# Patient Record
Sex: MALE | Race: WHITE | NOT HISPANIC OR LATINO | Employment: FULL TIME | URBAN - NONMETROPOLITAN AREA
[De-identification: names, ages, dates, MRNs, and addresses within clinical notes are randomized per-mention and may not be internally consistent; named-entity substitution may affect disease eponyms.]

---

## 2019-02-11 DIAGNOSIS — J10.1 INFLUENZA A: Primary | ICD-10-CM

## 2019-02-11 RX ORDER — OSELTAMIVIR PHOSPHATE 75 MG/1
75 CAPSULE ORAL 2 TIMES DAILY
Qty: 10 CAPSULE | Refills: 0 | Status: SHIPPED | OUTPATIENT
Start: 2019-02-11 | End: 2019-02-16

## 2021-03-24 ENCOUNTER — IMMUNIZATION (OUTPATIENT)
Dept: FAMILY MEDICINE | Facility: OTHER | Age: 46
End: 2021-03-24
Attending: FAMILY MEDICINE
Payer: COMMERCIAL

## 2021-03-24 PROCEDURE — 0001A PR COVID VAC PFIZER DIL RECON 30 MCG/0.3 ML IM: CPT

## 2021-03-24 PROCEDURE — 91300 PR COVID VAC PFIZER DIL RECON 30 MCG/0.3 ML IM: CPT

## 2021-04-14 ENCOUNTER — IMMUNIZATION (OUTPATIENT)
Dept: FAMILY MEDICINE | Facility: OTHER | Age: 46
End: 2021-04-14
Attending: FAMILY MEDICINE
Payer: COMMERCIAL

## 2021-04-14 PROCEDURE — 91300 PR COVID VAC PFIZER DIL RECON 30 MCG/0.3 ML IM: CPT

## 2021-04-14 PROCEDURE — 0002A PR COVID VAC PFIZER DIL RECON 30 MCG/0.3 ML IM: CPT

## 2022-07-12 ENCOUNTER — OFFICE VISIT (OUTPATIENT)
Dept: FAMILY MEDICINE | Facility: OTHER | Age: 47
End: 2022-07-12
Attending: FAMILY MEDICINE
Payer: COMMERCIAL

## 2022-07-12 ENCOUNTER — HOSPITAL ENCOUNTER (OUTPATIENT)
Dept: GENERAL RADIOLOGY | Facility: OTHER | Age: 47
Discharge: HOME | End: 2022-07-12
Attending: FAMILY MEDICINE
Payer: COMMERCIAL

## 2022-07-12 VITALS
WEIGHT: 191 LBS | RESPIRATION RATE: 20 BRPM | HEART RATE: 76 BPM | OXYGEN SATURATION: 98 % | SYSTOLIC BLOOD PRESSURE: 122 MMHG | DIASTOLIC BLOOD PRESSURE: 84 MMHG | TEMPERATURE: 97.4 F | BODY MASS INDEX: 29.47 KG/M2

## 2022-07-12 DIAGNOSIS — S99.912A ANKLE INJURY, LEFT, INITIAL ENCOUNTER: ICD-10-CM

## 2022-07-12 DIAGNOSIS — S82.302A CLOSED FRACTURE OF DISTAL END OF LEFT TIBIA, UNSPECIFIED FRACTURE MORPHOLOGY, INITIAL ENCOUNTER: Primary | ICD-10-CM

## 2022-07-12 DIAGNOSIS — S00.81XA ABRASION OF FOREHEAD, INITIAL ENCOUNTER: ICD-10-CM

## 2022-07-12 DIAGNOSIS — Z82.61 FAMILY HISTORY OF RHEUMATOID ARTHRITIS: ICD-10-CM

## 2022-07-12 PROBLEM — R20.9 DISTURBANCE OF SKIN SENSATION: Status: ACTIVE | Noted: 2022-07-12

## 2022-07-12 LAB
ALBUMIN SERPL-MCNC: 4.2 G/DL (ref 3.5–5.7)
ALP SERPL-CCNC: 51 U/L (ref 34–104)
ALT SERPL W P-5'-P-CCNC: 18 U/L (ref 7–52)
ANION GAP SERPL CALCULATED.3IONS-SCNC: 5 MMOL/L (ref 3–14)
AST SERPL W P-5'-P-CCNC: 22 U/L (ref 13–39)
BILIRUB SERPL-MCNC: 0.6 MG/DL (ref 0.3–1)
BUN SERPL-MCNC: 18 MG/DL (ref 7–25)
CALCIUM SERPL-MCNC: 9.4 MG/DL (ref 8.6–10.3)
CHLORIDE BLD-SCNC: 105 MMOL/L (ref 98–107)
CHOLEST SERPL-MCNC: 167 MG/DL
CO2 SERPL-SCNC: 30 MMOL/L (ref 21–31)
CREAT SERPL-MCNC: 0.99 MG/DL (ref 0.7–1.3)
FASTING STATUS PATIENT QL REPORTED: ABNORMAL
GFR SERPL CREATININE-BSD FRML MDRD: >90 ML/MIN/1.73M2
GLUCOSE BLD-MCNC: 95 MG/DL (ref 70–105)
HDLC SERPL-MCNC: 49 MG/DL (ref 23–92)
HOLD SPECIMEN: NORMAL
LDLC SERPL CALC-MCNC: 109 MG/DL
NONHDLC SERPL-MCNC: 118 MG/DL
POTASSIUM BLD-SCNC: 4.6 MMOL/L (ref 3.5–5.1)
PROT SERPL-MCNC: 7 G/DL (ref 6.4–8.9)
RHEUMATOID FACT SER NEPH-ACNC: <14 IU/ML
SODIUM SERPL-SCNC: 140 MMOL/L (ref 134–144)
TRIGL SERPL-MCNC: 47 MG/DL

## 2022-07-12 PROCEDURE — 80061 LIPID PANEL: CPT | Mod: ZL | Performed by: FAMILY MEDICINE

## 2022-07-12 PROCEDURE — 80053 COMPREHEN METABOLIC PANEL: CPT | Mod: ZL | Performed by: FAMILY MEDICINE

## 2022-07-12 PROCEDURE — 73630 X-RAY EXAM OF FOOT: CPT | Mod: LT

## 2022-07-12 PROCEDURE — 99203 OFFICE O/P NEW LOW 30 MIN: CPT | Performed by: FAMILY MEDICINE

## 2022-07-12 PROCEDURE — 73610 X-RAY EXAM OF ANKLE: CPT | Mod: LT

## 2022-07-12 PROCEDURE — 86431 RHEUMATOID FACTOR QUANT: CPT | Mod: ZL | Performed by: FAMILY MEDICINE

## 2022-07-12 PROCEDURE — 86200 CCP ANTIBODY: CPT | Mod: ZL | Performed by: FAMILY MEDICINE

## 2022-07-12 PROCEDURE — 36415 COLL VENOUS BLD VENIPUNCTURE: CPT | Mod: ZL | Performed by: FAMILY MEDICINE

## 2022-07-12 ASSESSMENT — PAIN SCALES - GENERAL: PAINLEVEL: MODERATE PAIN (5)

## 2022-07-12 NOTE — PROGRESS NOTES
Assessment & Plan       ICD-10-CM    1. Closed fracture of distal end of left tibia, unspecified fracture morphology, initial encounter  S82.302A Orthopedic  Referral     Ankle/Foot Bracing Supplies Order for DME - ONLY FOR DME   2. Ankle injury, left, initial encounter  S99.912A XR Ankle Left G/E 3 Views     XR Foot Left G/E 3 Views   3. Abrasion of forehead, initial encounter  S00.81XA    4. Family history of rheumatoid arthritis  Z82.61 Lipid Profile     Comprehensive Metabolic Panel     Rheumatoid factor     Cyclic Citrullinated Peptide Antibody IgG     Lipid Profile     Comprehensive Metabolic Panel     Rheumatoid factor     Cyclic Citrullinated Peptide Antibody IgG     1.  X-rays reviewed with patient and his wife.  2.  Discussed that the tibia is a weightbearing bone, therefore, during time of acute fracture he should be nonweightbearing.  He is placed in a boot today, he does have crutches.  Boot can be removed for icing, bathing at this time.  3.  He is to follow-up with sports medicine next week to discuss immobilization/casting and follow-up care.  4.  Continue with over-the-counter analgesics.  5.  Labs are reviewed as below. CCP is pending.      Review of the result(s) of each unique test - xray and labs  Ordering of each unique test  31 minutes spent on the date of the encounter doing chart review, history and exam, documentation and further activities per the note         Return in about 6 days (around 7/18/2022) for Dr Munoz.     Nursing Notes:   Viktoria Reid LPN  7/12/2022  3:26 PM  Signed  Pt presents to clinic today for a left ankle injury. Patient states on Friday he had a trench of dirt fall onto him and injured his ankle.       FOOD SECURITY SCREENING QUESTIONS:    The next two questions are to help us understand your food security.  If you are feeling you need any assistance in this area, we have resources available to support you today.    Hunger Vital Signs:  Within the  past 12 months we worried whether our food would run out before we got money to buy more. Never  Within the past 12 months the food we bought just didn't last and we didn't have money to get more. Never          Medication Reconciliation: Jeanette Avendano, ALEXANDER,LPN on 7/12/2022 at 3:05 PM        ANA CRISTINA ARREOLA MD  [facility]    Muna Dennis is a 46 year old, presenting for the following health issues:  Trauma  DOI - July 8th.   Left ankle injury - he was at work down in a 6 foot trench.  A chunk of dirt/rock fell from above  - glanced off the left side of his forehead and then landed on his left ankle.  He heard a snap on both sides of his ankle. This was about 100#.  It knocked him backwards too.  He did not get knocked out.   He had the immediate onset of ankle pain, initially could not bear weight.  Ankle started to become more swollen, bruising was noted on both medial and lateral sides.  He can move his toes.  Pain is worse medially.  Yesterday he could start to bear wt.  Has been icing.    Taking 2 aleve bid.      Trauma    History of Present Illness       Reason for visit:  Left ankle injury  Symptom onset:  1-3 days ago  Symptoms include:  Ankle injury at home  Symptom intensity:  Moderate  Symptom progression:  Improving  Had these symptoms before:  No  What makes it worse:  Stepping/walking  What makes it better:  Rest    He eats 2-3 servings of fruits and vegetables daily.He consumes 0 sweetened beverage(s) daily.He exercises with enough effort to increase his heart rate 10 to 19 minutes per day.  He exercises with enough effort to increase his heart rate 3 or less days per week.   He is taking medications regularly.       Pain History:  When did you first notice your pain? - Acute Pain   Have you seen anyone else for your pain? No  Where in your body do you have pain? left ankle     No current outpatient medications on file.     No current  facility-administered medications for this visit.        Review of Systems   He is hoping to have some labs done today.  A few weeks ago, he was having some pain in his right hand.  Positive family history of rheumatoid arthritis in his father.  Also, is hoping to have cholesterol/diabetes testing done.      Objective    /84 (BP Location: Right arm, Patient Position: Sitting, Cuff Size: Adult Large)   Pulse 76   Temp 97.4  F (36.3  C) (Tympanic)   Resp 20   Wt 86.6 kg (191 lb)   SpO2 98%   BMI 29.47 kg/m    Body mass index is 29.47 kg/m .  Physical Exam   GENERAL: healthy, alert and no distress  EYES: Eyes grossly normal to inspection  HENT: Abrasion left side of his forehead  MS: Left ankle is swollen.  There is ecchymosis medially and laterally, especially along the lower area of the foot.  He has tenderness of both medial and lateral malleolus, does not have tenderness anteriorly.  Mild decreased range of motion in flexion and extension but moderate decreased range of motion in inversion and eversion.  Normal sensation distally, normal capillary refill.    Results for orders placed or performed during the hospital encounter of 07/12/22   XR Ankle Left G/E 3 Views     Status: None    Narrative    PROCEDURE:  XR ANKLE LEFT G/E 3 VIEWS    HISTORY: Ankle injury, left, initial encounter    COMPARISON:  None.    TECHNIQUE:  3 views of the left ankle were obtained.    FINDINGS: There is an oblique fracture of the medial malleolus. The  fractures in anatomic alignment. The lateral malleolus is intact. The  talus and calcaneus appear normal.      Impression    IMPRESSION: Nondisplaced medial malleolar fracture.      PAIGE JAFFE MD         SYSTEM ID:  [id number]   Results for orders placed or performed during the hospital encounter of 07/12/22   XR Foot Left G/E 3 Views     Status: None    Narrative    PROCEDURE:  XR FOOT LEFT G/E 3 VIEWS    HISTORY: Ankle injury, left, initial encounter    COMPARISON:   None.    TECHNIQUE:  3 views of the left foot were obtained.    FINDINGS:  No fracture or dislocation is identified. The joint spaces  are preserved.        Impression    IMPRESSION: No acute fracture.      PAIGE JAFFE MD         SYSTEM ID:  [id number]   Results for orders placed or performed in visit on 07/12/22   Lipid Profile     Status: Abnormal   Result Value Ref Range    Cholesterol 167 <200 mg/dL    Triglycerides 47 <150 mg/dL    Direct Measure HDL 49 23 - 92 mg/dL    LDL Cholesterol Calculated 109 (H) <=100 mg/dL    Non HDL Cholesterol 118 <130 mg/dL    Patient Fasting > 8hrs? Unknown     Narrative    Cholesterol  Desirable:  <200 mg/dL    Triglycerides  Normal:  Less than 150 mg/dL  Borderline High:  150-199 mg/dL  High:  200-499 mg/dL  Very High:  Greater than or equal to 500 mg/dL    Direct Measure HDL  Female:  Greater than or equal to 50 mg/dL   Male:  Greater than or equal to 40 mg/dL    LDL Cholesterol  Desirable:  <100mg/dL  Above Desirable:  100-129 mg/dL   Borderline High:  130-159 mg/dL   High:  160-189 mg/dL   Very High:  >= 190 mg/dL    Non HDL Cholesterol  Desirable:  130 mg/dL  Above Desirable:  130-159 mg/dL  Borderline High:  160-189 mg/dL  High:  190-219 mg/dL  Very High:  Greater than or equal to 220 mg/dL   Comprehensive Metabolic Panel     Status: Normal   Result Value Ref Range    Sodium 140 134 - 144 mmol/L    Potassium 4.6 3.5 - 5.1 mmol/L    Chloride 105 98 - 107 mmol/L    Carbon Dioxide (CO2) 30 21 - 31 mmol/L    Anion Gap 5 3 - 14 mmol/L    Urea Nitrogen 18 7 - 25 mg/dL    Creatinine 0.99 0.70 - 1.30 mg/dL    Calcium 9.4 8.6 - 10.3 mg/dL    Glucose 95 70 - 105 mg/dL    Alkaline Phosphatase 51 34 - 104 U/L    AST 22 13 - 39 U/L    ALT 18 7 - 52 U/L    Protein Total 7.0 6.4 - 8.9 g/dL    Albumin 4.2 3.5 - 5.7 g/dL    Bilirubin Total 0.6 0.3 - 1.0 mg/dL    GFR Estimate >90 >60 mL/min/1.73m2   Rheumatoid factor     Status: Normal   Result Value Ref Range    Rheumatoid Factor <14 <14  IU/mL   Extra Tube     Status: None    Narrative    The following orders were created for panel order Extra Tube.  Procedure                               Abnormality         Status                     ---------                               -----------         ------                     Extra Purple Top Tube[[id number]]                            Final result                 Please view results for these tests on the individual orders.   Extra Purple Top Tube     Status: None   Result Value Ref Range    Hold Specimen                     .  ..

## 2022-07-12 NOTE — NURSING NOTE
Pt presents to clinic today for a left ankle injury. Patient states on Friday he had a trench of dirt fall onto him and injured his ankle.       FOOD SECURITY SCREENING QUESTIONS:    The next two questions are to help us understand your food security.  If you are feeling you need any assistance in this area, we have resources available to support you today.    Hunger Vital Signs:  Within the past 12 months we worried whether our food would run out before we got money to buy more. Never  Within the past 12 months the food we bought just didn't last and we didn't have money to get more. Never          Medication Reconciliation: complete  Jeanette Moeller LPN,LPN on 7/12/2022 at 3:05 PM

## 2022-07-14 ENCOUNTER — TELEPHONE (OUTPATIENT)
Dept: FAMILY MEDICINE | Facility: OTHER | Age: 47
End: 2022-07-14

## 2022-07-14 LAB — CCP AB SER IA-ACNC: 1.6 U/ML

## 2022-07-14 NOTE — TELEPHONE ENCOUNTER
[facility]   Please call back.  Results?        Yudelka Castillo on 7/14/2022 at 9:04 AM

## 2022-07-18 ENCOUNTER — OFFICE VISIT (OUTPATIENT)
Dept: FAMILY MEDICINE | Facility: OTHER | Age: 47
End: 2022-07-18
Attending: FAMILY MEDICINE
Payer: COMMERCIAL

## 2022-07-18 ENCOUNTER — HOSPITAL ENCOUNTER (OUTPATIENT)
Dept: GENERAL RADIOLOGY | Facility: OTHER | Age: 47
Discharge: HOME | End: 2022-07-18
Attending: FAMILY MEDICINE
Payer: COMMERCIAL

## 2022-07-18 VITALS
HEART RATE: 62 BPM | DIASTOLIC BLOOD PRESSURE: 80 MMHG | OXYGEN SATURATION: 97 % | BODY MASS INDEX: 30.43 KG/M2 | RESPIRATION RATE: 16 BRPM | TEMPERATURE: 97.3 F | WEIGHT: 197.2 LBS | SYSTOLIC BLOOD PRESSURE: 118 MMHG

## 2022-07-18 DIAGNOSIS — S82.55XA CLOSED NONDISPLACED FRACTURE OF MEDIAL MALLEOLUS OF LEFT TIBIA, INITIAL ENCOUNTER: Primary | ICD-10-CM

## 2022-07-18 PROCEDURE — 99207 PR FRACTURE CARE IN GLOBAL PERIOD: CPT | Performed by: FAMILY MEDICINE

## 2022-07-18 PROCEDURE — 73610 X-RAY EXAM OF ANKLE: CPT | Mod: LT

## 2022-07-18 RX ORDER — NAPROXEN SODIUM 220 MG
220 TABLET ORAL 2 TIMES DAILY WITH MEALS
COMMUNITY
End: 2023-03-17

## 2022-07-18 ASSESSMENT — PAIN SCALES - GENERAL: PAINLEVEL: MODERATE PAIN (4)

## 2022-07-18 NOTE — PROGRESS NOTES
Sports Medicine Office Note    HPI:  47-year-old male coming in for evaluation of a left ankle injury that took place on .  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  He describes an axial load through the ankle.  No significant twisting or rolling of the ankle.  He was seen in clinic on  and placed nonweightbearing in a walking boot.  He endorses 4/10 pain.  He characterizes the pain as dull.  He has been using ice and OTC medications.      EXAM:  /80 (BP Location: Right arm, Patient Position: Sitting, Cuff Size: Adult Regular)   Pulse 62   Temp 97.3  F (36.3  C) (Tympanic)   Resp 16   Wt 89.4 kg (197 lb 3.2 oz)   SpO2 97%   BMI 30.43 kg/m    MUSCULOSKELETAL EXAM:  LEFT FOOT AND ANKLE  Inspection:  -No gross deformity  -No bruising  - Mild swelling about the ankle    Tenderness to palpation of the:  -Fibular head:  Negative  -Fibular shaft:  Negative  -Tibial shaft: Mild pain  -Medial malleolus: Positive  -Deltoid ligament: Mild pain  -Lateral malleolus:  Negative  -ATFL:  Negative  -CFL:  Negative  -PTFL:  Negative  -Syndesmosis (AITFL):  Negative  -Midsubstance Achilles tendon:  Negative  -Achilles tendon insertion:  Negative  -Plantar fascial origin on the calcaneus:  Negative  -Base of the fifth metatarsal:  Negative  -Navicular:  Negative  -Lisfranc joint:  Negative  -Metatarsals:  Negative    Strength:  -Dorsiflexion:  5/5  -Plantarflexion:  5/5  -Inversion:  4/5  -Eversion:  4/5    Other:  -Intact sensation to light touch distally.  -No signs of cyanosis. Normal skin temperature.  -Knee:  No gross deformity. Normal motion.  -Right foot/ankle:  No gross deformity. No palpable tenderness. Normal strength.      IMAGIN2022: 3 view left ankle x-ray  - Nondisplaced medial malleolus fracture.  Ankle mortise appears intact.    2022: 3 view left ankle x-ray  - Nondisplaced medial malleolus fracture.  There does appear to be an intra-articular component to the fracture that  is nonweightbearing but does involve the weightbearing portion of the ankle joint.      ASSESSMENT/PLAN:  Diagnoses and all orders for this visit:  Closed nondisplaced fracture of medial malleolus of left tibia, initial encounter  -     XR Ankle Left G/E 3 Views  -     Orthopedic  Referral    47-year-old male with a closed, nondisplaced medial malleoli are fracture extending into the intra-articular weightbearing portion of the joint.  X-rays from 7/12 and 7/18 were both personally reviewed in the office with the findings as demonstrated above by my interpretation.  Because of the intra-articular component of the fracture, I recommend continuing nonweightbearing status.  - Continue nonweightbearing on crutches  - Discussed immobilization options  - Continue OTC analgesics as needed  - Follow-up in 2 weeks for repeat x-rays out of the boot  - Anticipate approximately 6 weeks of nonweightbearing before transitioning into a weightbearing status  - He will likely need rehab on the back end of immobilization, likely 10-12 weeks after injury before resuming full activities    Global fracture code billing (CPT:  64987)       Josesito Chance MD  7/18/2022  3:14 PM    Total time spent with this patient was 26 minutes which included chart review, visualization and independent interpretation of images, time spent with the patient, and documentation.    Procedure time:  0 minute(s)

## 2022-07-18 NOTE — NURSING NOTE
"Chief Complaint   Patient presents with     ankle injury     Left ankle injury, DOI: 7/8/22     Patient presents for left ankle injury. DOI: 7/8/22. Pain 4/10. He is in a short walking boot at appointment today. He is mostly non weight bearing with crutches.     Initial /80 (BP Location: Right arm, Patient Position: Sitting, Cuff Size: Adult Regular)   Pulse 62   Temp 97.3  F (36.3  C) (Tympanic)   Resp 16   Wt 89.4 kg (197 lb 3.2 oz)   SpO2 97%   BMI 30.43 kg/m   Estimated body mass index is 30.43 kg/m  as calculated from the following:    Height as of 7/8/13: 1.715 m (5' 7.5\").    Weight as of this encounter: 89.4 kg (197 lb 3.2 oz).       Medication Reconciliation: Complete    Brisa Kaufman LPN .......  7/18/2022  3:21 PM   "

## 2022-08-01 ENCOUNTER — OFFICE VISIT (OUTPATIENT)
Dept: FAMILY MEDICINE | Facility: OTHER | Age: 47
End: 2022-08-01
Attending: FAMILY MEDICINE
Payer: COMMERCIAL

## 2022-08-01 ENCOUNTER — HOSPITAL ENCOUNTER (OUTPATIENT)
Dept: GENERAL RADIOLOGY | Facility: OTHER | Age: 47
Discharge: HOME | End: 2022-08-01
Attending: FAMILY MEDICINE
Payer: COMMERCIAL

## 2022-08-01 VITALS
SYSTOLIC BLOOD PRESSURE: 118 MMHG | TEMPERATURE: 97.1 F | HEART RATE: 64 BPM | BODY MASS INDEX: 30.31 KG/M2 | OXYGEN SATURATION: 98 % | DIASTOLIC BLOOD PRESSURE: 78 MMHG | RESPIRATION RATE: 16 BRPM | WEIGHT: 196.4 LBS

## 2022-08-01 DIAGNOSIS — S82.55XD CLOSED NONDISPLACED FRACTURE OF MEDIAL MALLEOLUS OF LEFT TIBIA WITH ROUTINE HEALING, SUBSEQUENT ENCOUNTER: Primary | ICD-10-CM

## 2022-08-01 DIAGNOSIS — S93.492D SPRAIN OF ANTERIOR TALOFIBULAR LIGAMENT OF LEFT ANKLE, SUBSEQUENT ENCOUNTER: ICD-10-CM

## 2022-08-01 PROCEDURE — 99207 PR FRACTURE CARE IN GLOBAL PERIOD: CPT | Performed by: FAMILY MEDICINE

## 2022-08-01 PROCEDURE — 73610 X-RAY EXAM OF ANKLE: CPT | Mod: LT

## 2022-08-01 ASSESSMENT — PAIN SCALES - GENERAL: PAINLEVEL: NO PAIN (1)

## 2022-08-01 NOTE — PROGRESS NOTES
Sports Medicine Office Note    HPI:  47-year-old male coming in for follow-up evaluation of a left ankle injury that took place on .  He was seen in primary care clinic on .  He followed up in this office on .  He has a known medial malleolus fracture.  He is currently being treated nonweightbearing, on crutches, in a walking boot.  He is currently endorsing 1/10 pain.  He characterizes the pain as dull.      EXAM:  /78 (BP Location: Right arm, Patient Position: Sitting, Cuff Size: Adult Regular)   Pulse 64   Temp 97.1  F (36.2  C) (Tympanic)   Resp 16   Wt 89.1 kg (196 lb 6.4 oz)   SpO2 98%   BMI 30.31 kg/m    MUSCULOSKELETAL EXAM:  LEFT FOOT AND ANKLE  Inspection:  -No gross deformity  -No bruising or swelling  -Scars:  None    Tenderness to palpation of the:  -Fibular head:  Negative  -Fibular shaft:  Negative  -Tibial shaft:  Negative  -Medial malleolus:  Negative  -Deltoid ligament:  Negative  -Lateral malleolus:  Negative  -ATFL: Mild pain  -CFL:  Negative  -PTFL:  Negative  -Syndesmosis (AITFL):  Negative  -Midsubstance Achilles tendon:  Negative  -Achilles tendon insertion:  Negative  -Plantar fascial origin on the calcaneus:  Negative  -Base of the fifth metatarsal:  Negative  -Navicular:  Negative  -Lisfranc joint:  Negative  -Metatarsals:  Negative    Other:  -Intact sensation to light touch distally.  -No signs of cyanosis. Normal skin temperature.  -Knee:  No gross deformity. Normal motion.  -Right foot/ankle:  No gross deformity. No palpable tenderness. Normal strength.      IMAGIN2022: 3 view left ankle x-ray  - Nondisplaced medial malleolus fracture.  Ankle mortise appears intact.     2022: 3 view left ankle x-ray  - Nondisplaced medial malleolus fracture.  There does appear to be an intra-articular component to the fracture that is nonweightbearing but does involve the weightbearing portion of the ankle joint.    2022: 3 view left ankle x-ray  - Nondisplaced  medial malleolus fracture is again noted.  No change in bony alignment.      ASSESSMENT/PLAN:  Diagnoses and all orders for this visit:  Closed nondisplaced fracture of medial malleolus of left tibia with routine healing, subsequent encounter  -     XR Ankle Left G/E 3 Views  Sprain of anterior talofibular ligament of left ankle, subsequent encounter    47-year-old male with a closed, nondisplaced medial malleolus fracture extending into the intra-articular weightbearing portion of the joint.  X-rays from 7/12, 7/18, and 8/1 were all personally reviewed in the office with the findings as demonstrated above by my interpretation.  - Continue nonweightbearing  - Continue the walking boot  - Continue OTC analgesics as needed  - This patient's daughter is getting  in 5 days and he would like to walk her down the aisle, we had a risks/benefits discussion about doing this with his current injury  - Follow-up in 3 weeks for repeat x-rays out of the boot  - If patient is beginning to demonstrate good clinical and radiographic evidence of healing, consider transitioning into a weightbearing status    Global fracture code billing (CPT:  92221)       Josesito Chance MD  8/1/2022  2:55 PM    Total time spent with this patient was 23 minutes which included chart review, visualization and independent interpretation of images, time spent with the patient, and documentation.    Procedure time:  0 minute(s)

## 2022-08-01 NOTE — NURSING NOTE
"Chief Complaint   Patient presents with     Follow Up     Medial malleolus left tibia fracture     Patient presents for follow up medial malleolus left tibia fracture. Pain 1/10. He is non weight bearing with crutches and in a short walking boot at appointment today.     Initial /78 (BP Location: Right arm, Patient Position: Sitting, Cuff Size: Adult Regular)   Pulse 64   Temp 97.1  F (36.2  C) (Tympanic)   Resp 16   Wt 89.1 kg (196 lb 6.4 oz)   SpO2 98%   BMI 30.31 kg/m   Estimated body mass index is 30.31 kg/m  as calculated from the following:    Height as of 7/8/13: 1.715 m (5' 7.5\").    Weight as of this encounter: 89.1 kg (196 lb 6.4 oz).       Medication Reconciliation: Complete    Brisa Kaufman LPN .......  8/1/2022  3:07 PM   "

## 2022-08-30 ENCOUNTER — HOSPITAL ENCOUNTER (OUTPATIENT)
Dept: GENERAL RADIOLOGY | Facility: OTHER | Age: 47
Discharge: HOME | End: 2022-08-30
Attending: FAMILY MEDICINE
Payer: COMMERCIAL

## 2022-08-30 ENCOUNTER — OFFICE VISIT (OUTPATIENT)
Dept: FAMILY MEDICINE | Facility: OTHER | Age: 47
End: 2022-08-30
Attending: FAMILY MEDICINE
Payer: COMMERCIAL

## 2022-08-30 VITALS
WEIGHT: 201 LBS | RESPIRATION RATE: 16 BRPM | TEMPERATURE: 96.8 F | DIASTOLIC BLOOD PRESSURE: 86 MMHG | BODY MASS INDEX: 31.02 KG/M2 | SYSTOLIC BLOOD PRESSURE: 130 MMHG | OXYGEN SATURATION: 98 % | HEART RATE: 73 BPM

## 2022-08-30 VITALS
SYSTOLIC BLOOD PRESSURE: 130 MMHG | RESPIRATION RATE: 16 BRPM | TEMPERATURE: 96.8 F | BODY MASS INDEX: 31.14 KG/M2 | DIASTOLIC BLOOD PRESSURE: 86 MMHG | WEIGHT: 201.8 LBS | OXYGEN SATURATION: 98 % | HEART RATE: 73 BPM

## 2022-08-30 DIAGNOSIS — Z23 NEED FOR DIPHTHERIA-TETANUS-PERTUSSIS (TDAP) VACCINE: ICD-10-CM

## 2022-08-30 DIAGNOSIS — K62.5 RECTAL BLEEDING: Primary | ICD-10-CM

## 2022-08-30 DIAGNOSIS — S82.55XD CLOSED NONDISPLACED FRACTURE OF MEDIAL MALLEOLUS OF LEFT TIBIA WITH ROUTINE HEALING, SUBSEQUENT ENCOUNTER: ICD-10-CM

## 2022-08-30 DIAGNOSIS — S82.55XD CLOSED NONDISPLACED FRACTURE OF MEDIAL MALLEOLUS OF LEFT TIBIA WITH ROUTINE HEALING, SUBSEQUENT ENCOUNTER: Primary | ICD-10-CM

## 2022-08-30 DIAGNOSIS — Z12.11 SPECIAL SCREENING FOR MALIGNANT NEOPLASMS, COLON: ICD-10-CM

## 2022-08-30 PROCEDURE — 90471 IMMUNIZATION ADMIN: CPT | Performed by: FAMILY MEDICINE

## 2022-08-30 PROCEDURE — 99207 PR FRACTURE CARE IN GLOBAL PERIOD: CPT | Performed by: FAMILY MEDICINE

## 2022-08-30 PROCEDURE — 99213 OFFICE O/P EST LOW 20 MIN: CPT | Mod: 25 | Performed by: FAMILY MEDICINE

## 2022-08-30 PROCEDURE — 90715 TDAP VACCINE 7 YRS/> IM: CPT | Performed by: FAMILY MEDICINE

## 2022-08-30 PROCEDURE — 73610 X-RAY EXAM OF ANKLE: CPT | Mod: LT

## 2022-08-30 ASSESSMENT — PAIN SCALES - GENERAL
PAINLEVEL: MODERATE PAIN (4)
PAINLEVEL: NO PAIN (1)

## 2022-08-30 NOTE — NURSING NOTE
"Chief Complaint   Patient presents with     Injury     Follow up      Patient is here for a follow up on ankle injury. He did just see Dr. Munoz also for it.     Initial /86   Pulse 73   Temp 96.8  F (36  C)   Resp 16   Wt 91.2 kg (201 lb)   SpO2 98%   BMI 31.02 kg/m   Estimated body mass index is 31.02 kg/m  as calculated from the following:    Height as of 7/8/13: 1.715 m (5' 7.5\").    Weight as of this encounter: 91.2 kg (201 lb).  Medication Reconciliation: complete    Marimar Walker MA       FOOD SECURITY SCREENING QUESTIONS:    The next two questions are to help us understand your food security.  If you are feeling you need any assistance in this area, we have resources available to support you today.    Hunger Vital Signs:  Within the past 12 months we worried whether our food would run out before we got money to buy more. Never  Within the past 12 months the food we bought just didn't last and we didn't have money to get more. Never  Marimar Walker MA,LPN on 8/30/2022 at 3:12 PM    Immunization Documentation    Prior to Immunization administration, verified patients identity using patient's name and date of birth. Please see IMMUNIZATIONS  and order for additional information.  Patient / Parent instructed to remain in clinic for 15 minutes and report any adverse reaction to staff immediately.    Was the entire amount of vaccines given used? Yes    Marimar Walker MA  8/30/2022   3:33 PM        "

## 2022-08-30 NOTE — NURSING NOTE
"Chief Complaint   Patient presents with     Follow Up     Medial malleolus left tibia     Patient presents for follow up medial malleolus fracture left tibia. Pain 1/10. He is in a short walking boot at appointment today and mostly non weight bearing.     Initial /86 (BP Location: Right arm, Patient Position: Sitting, Cuff Size: Adult Regular)   Pulse 73   Temp 96.8  F (36  C) (Tympanic)   Resp 16   Wt 91.5 kg (201 lb 12.8 oz)   SpO2 98%   BMI 31.14 kg/m   Estimated body mass index is 31.14 kg/m  as calculated from the following:    Height as of 7/8/13: 1.715 m (5' 7.5\").    Weight as of this encounter: 91.5 kg (201 lb 12.8 oz).       Medication Reconciliation: Complete    Brisa Kaufman LPN .......  8/30/2022  2:40 PM   "

## 2022-08-30 NOTE — PROGRESS NOTES
Sports Medicine Office Note    HPI:  47-year-old male coming in for follow-up evaluation of a left ankle injury that took place on .  He has a nondisplaced fracture to the medial malleolus of the left tibia.  He was last seen in this office on .  He is currently nonweightbearing in a walking boot.  He is endorsing 75% improvement in his symptoms.  His current pain is 1/10.  He characterizes the pain as dull.  No new injuries.      EXAM:  /86 (BP Location: Right arm, Patient Position: Sitting, Cuff Size: Adult Regular)   Pulse 73   Temp 96.8  F (36  C) (Tympanic)   Resp 16   Wt 91.5 kg (201 lb 12.8 oz)   SpO2 98%   BMI 31.14 kg/m    MUSCULOSKELETAL EXAM:  LEFT FOOT AND ANKLE  Inspection:  -No gross deformity  -No bruising or swelling  -Scars:  None    Tenderness to palpation of the:  -Fibular head:  Negative  -Fibular shaft:  Negative  -Tibial shaft:  Negative  -Medial malleolus:  Negative  -Deltoid ligament:  Negative  -Lateral malleolus:  Negative  -ATFL:  Negative  -CFL:  Negative  -PTFL:  Negative  -Syndesmosis (AITFL):  Negative  -Midsubstance Achilles tendon:  Negative  -Achilles tendon insertion:  Negative  -Plantar fascial origin on the calcaneus:  Negative  -Base of the fifth metatarsal:  Negative  -Navicular:  Negative  -Lisfranc joint:  Negative  -Metatarsals:  Negative    Strength:  -Dorsiflexion:  5/5  -Plantarflexion:  5/5  -Inversion:  5/5  -Eversion:  5/5    Other:  -Intact sensation to light touch distally.  -No signs of cyanosis. Normal skin temperature.  -Knee:  No gross deformity. Normal motion.  -Right foot/ankle:  No gross deformity. No palpable tenderness. Normal strength.      IMAGIN2022: 3 view left ankle x-ray  - Nondisplaced medial malleolus fracture.  Ankle mortise appears intact.     2022: 3 view left ankle x-ray  - Nondisplaced medial malleolus fracture.  There does appear to be an intra-articular component to the fracture that is nonweightbearing but does  involve the weightbearing portion of the ankle joint.     8/1/2022: 3 view left ankle x-ray  - Nondisplaced medial malleolus fracture is again noted.  No change in bony alignment.    8/30/2022: 3 view left ankle x-ray  - Nondisplaced medial malleolus fracture is again noted.  No change in bony alignment.  Interval bony callus formation is demonstrated.      ASSESSMENT/PLAN:  Diagnoses and all orders for this visit:  Closed nondisplaced fracture of medial malleolus of left tibia with routine healing, subsequent encounter  -     XR Ankle Left G/E 3 Views    47-year-old male with a closed, nondisplaced medial malleolus fracture extending into the intra-articular weightbearing portion of the joint.  X-rays from 7/12, 7/18, 8/1, and 8/30 were all personally reviewed in the office with the findings as demonstrated above by my interpretation.  This patient is now 7 weeks and 4 days out from his injury.  Fracture line on radiograph is still present though there is some interval bony callus formation.  Clinically he demonstrates significant improvement.  - Ease back into weightbearing using the boot or ankle brace for protection  - Protect the ankle from repeat inversion injury for the next 3 weeks  - Use crutches only as needed  - If pain worsens, recommend going back onto crutches for another 2-3 weeks and come in for follow-up x-rays  - After 2-3 weeks he can determine whether or not he feels like he is progressing enough and whether or not he needs PT  - Follow-up as needed    Global fracture code billing (CPT:  76106)       Josesito Chance MD  8/30/2022  2:46 PM    Total time spent with this patient was 25 minutes which included chart review, visualization and independent interpretation of images, time spent with the patient, and documentation.    Procedure time:  0 minute(s)

## 2022-08-30 NOTE — PROGRESS NOTES
Assessment & Plan       ICD-10-CM    1. Rectal bleeding  K62.5 Adult GI  Referral - Procedure Only   2. Special screening for malignant neoplasms, colon  Z12.11 Adult GI  Referral - Procedure Only   3. Need for diphtheria-tetanus-pertussis (Tdap) vaccine  Z23 TDAP VACCINE (Adacel, Boostrix)  [[id number]]     1.  Differential diagnosis of bleeding is discussed.  He is not having symptoms now.  Most likely, bleeding is from internal hemorrhoid, consider polyp, mass, diverticular bleed.  Discussed diagnostic and possibly therapeutic colonoscopy (banding of hemorrhoid) as well as colon cancer screening based on age.  Referral is placed.  2.  Boostrix updated today             Return if symptoms worsen or fail to improve.    ANA CRISTINA ARREOLA MD  [facility]    Subjective   Sonny is a 47 year old, presenting for the following health issues:  Injury (Follow up )    Sonny Dubois is a 47 year old male in for evaluation of rectal bleeding.  He was taking fish oil and aleve - taking this for ankle fracture.  He then had BRBR - a fair amount.  This would be bleeding with a BM.    No pain with this.  No heartburn or other GI symptoms.    He stopped the fish oil and symptoms went away after several days, about 3 days or so.  Continues to take aleve once a day for soreness.      Colonoscopy done in 2009 - normal.    No FH of bowel disease.    He thinks he's had an internal hemorrhoid.      Injury         Pain History:  When did you first notice your pain? - Acute Pain   Have you seen anyone else for your pain? Yes - Dr. Munoz   Where in your body do you have pain? ankle     Current Outpatient Medications   Medication     naproxen sodium (ANAPROX) 220 MG tablet     No current facility-administered medications for this visit.        Review of Systems   He has been following up with sports medicine for his left ankle fracture.      He does get light bruising on arms and legs  with working.    No pain or soreness with this. This is without significant injury.        Objective    /86   Pulse 73   Temp 96.8  F (36  C)   Resp 16   Wt 91.2 kg (201 lb)   SpO2 98%   BMI 31.02 kg/m    Body mass index is 31.02 kg/m .  Physical Exam   GENERAL: healthy, alert and no distress  RESP: lungs clear to auscultation - no rales, rhonchi or wheezes  CV: regular rates and rhythm  MUSCULOSKELETAL:  Walking boot on, left                   .  ..

## 2022-08-31 ENCOUNTER — TELEPHONE (OUTPATIENT)
Dept: SURGERY | Facility: OTHER | Age: 47
End: 2022-08-31

## 2022-08-31 NOTE — TELEPHONE ENCOUNTER
[facility] Diagnostic Referral    Patient has a referral for a colonoscopy with a diagnosis of Rectal bleeding.    Please advise.    Thank you,Becca Clark on 8/31/2022 at 8:51 AM

## 2022-09-15 DIAGNOSIS — Z12.11 ENCOUNTER FOR SCREENING COLONOSCOPY: Primary | ICD-10-CM

## 2022-09-15 NOTE — TELEPHONE ENCOUNTER
Screening Questions for the Scheduling of Screening Colonoscopies   (If Colonoscopy is diagnostic, Provider should review the chart before scheduling.)  Are you younger than 50 or older than 80?  YES  Do you take aspirin or fish oil?  YES FISH OIL (if yes, tell patient to stop 1 week prior to Colonoscopy)  Do you take warfarin (Coumadin), clopidogrel (Plavix), apixaban (Eliquis), dabigatram (Pradaxa), rivaroxaban (Xarelto) or any blood thinner? NO  Do you use oxygen at home?  NO  Do you have kidney disease? NO  Are you on dialysis? NO  Have you had a stroke or heart attack in the last year? NO  Have you had a stent in your heart or any blood vessel in the last year? NO  Have you had a transplant of any organ? NO  Have you had a colonoscopy or upper endoscopy (EGD) before? YES         When?  04/10/2009  Date of scheduled Colonoscopy. 11/14/2022  Provider ANTHONY  Pharmacy GI  HOME COVID TEST 11/12/2022

## 2022-09-17 ENCOUNTER — HEALTH MAINTENANCE LETTER (OUTPATIENT)
Age: 47
End: 2022-09-17

## 2022-09-26 RX ORDER — BISACODYL 5 MG/1
10 TABLET, DELAYED RELEASE ORAL DAILY PRN
Qty: 2 TABLET | Refills: 0 | Status: ON HOLD | OUTPATIENT
Start: 2022-11-01 | End: 2022-11-14

## 2022-11-08 PROBLEM — K62.5 RECTAL BLEEDING: Status: ACTIVE | Noted: 2022-11-08

## 2022-11-14 ENCOUNTER — ANESTHESIA (OUTPATIENT)
Dept: SURGERY | Facility: OTHER | Age: 47
End: 2022-11-14
Payer: COMMERCIAL

## 2022-11-14 ENCOUNTER — HOSPITAL ENCOUNTER (OUTPATIENT)
Facility: OTHER | Age: 47
Discharge: HOME | End: 2022-11-14
Attending: SURGERY | Admitting: SURGERY
Payer: COMMERCIAL

## 2022-11-14 ENCOUNTER — ANESTHESIA EVENT (OUTPATIENT)
Dept: SURGERY | Facility: OTHER | Age: 47
End: 2022-11-14
Payer: COMMERCIAL

## 2022-11-14 VITALS
SYSTOLIC BLOOD PRESSURE: 130 MMHG | BODY MASS INDEX: 30.09 KG/M2 | TEMPERATURE: 97.9 F | WEIGHT: 195 LBS | OXYGEN SATURATION: 100 % | HEART RATE: 44 BPM | RESPIRATION RATE: 16 BRPM | DIASTOLIC BLOOD PRESSURE: 85 MMHG

## 2022-11-14 PROCEDURE — 250N000009 HC RX 250: Performed by: NURSE ANESTHETIST, CERTIFIED REGISTERED

## 2022-11-14 PROCEDURE — 45378 DIAGNOSTIC COLONOSCOPY: CPT | Performed by: SURGERY

## 2022-11-14 PROCEDURE — 999N000010 HC STATISTIC ANES STAT CODE-CRNA PER MINUTE: Performed by: SURGERY

## 2022-11-14 PROCEDURE — 93005 ELECTROCARDIOGRAM TRACING: CPT | Mod: XU

## 2022-11-14 PROCEDURE — 258N000003 HC RX IP 258 OP 636: Performed by: SURGERY

## 2022-11-14 PROCEDURE — 93010 ELECTROCARDIOGRAM REPORT: CPT | Performed by: INTERNAL MEDICINE

## 2022-11-14 PROCEDURE — 250N000011 HC RX IP 250 OP 636: Performed by: NURSE ANESTHETIST, CERTIFIED REGISTERED

## 2022-11-14 PROCEDURE — 45378 DIAGNOSTIC COLONOSCOPY: CPT | Performed by: NURSE ANESTHETIST, CERTIFIED REGISTERED

## 2022-11-14 RX ORDER — NALOXONE HYDROCHLORIDE 0.4 MG/ML
0.2 INJECTION, SOLUTION INTRAMUSCULAR; INTRAVENOUS; SUBCUTANEOUS
Status: DISCONTINUED | OUTPATIENT
Start: 2022-11-14 | End: 2022-11-14 | Stop reason: HOSPADM

## 2022-11-14 RX ORDER — PROPOFOL 10 MG/ML
INJECTION, EMULSION INTRAVENOUS CONTINUOUS PRN
Status: DISCONTINUED | OUTPATIENT
Start: 2022-11-14 | End: 2022-11-14

## 2022-11-14 RX ORDER — LIDOCAINE HYDROCHLORIDE 20 MG/ML
INJECTION, SOLUTION INFILTRATION; PERINEURAL PRN
Status: DISCONTINUED | OUTPATIENT
Start: 2022-11-14 | End: 2022-11-14

## 2022-11-14 RX ORDER — NALOXONE HYDROCHLORIDE 0.4 MG/ML
0.4 INJECTION, SOLUTION INTRAMUSCULAR; INTRAVENOUS; SUBCUTANEOUS
Status: DISCONTINUED | OUTPATIENT
Start: 2022-11-14 | End: 2022-11-14 | Stop reason: HOSPADM

## 2022-11-14 RX ORDER — LIDOCAINE 40 MG/G
CREAM TOPICAL
Status: DISCONTINUED | OUTPATIENT
Start: 2022-11-14 | End: 2022-11-14 | Stop reason: HOSPADM

## 2022-11-14 RX ORDER — SODIUM CHLORIDE, SODIUM LACTATE, POTASSIUM CHLORIDE, CALCIUM CHLORIDE 600; 310; 30; 20 MG/100ML; MG/100ML; MG/100ML; MG/100ML
INJECTION, SOLUTION INTRAVENOUS CONTINUOUS
Status: DISCONTINUED | OUTPATIENT
Start: 2022-11-14 | End: 2022-11-14 | Stop reason: HOSPADM

## 2022-11-14 RX ORDER — FLUMAZENIL 0.1 MG/ML
0.2 INJECTION, SOLUTION INTRAVENOUS
Status: DISCONTINUED | OUTPATIENT
Start: 2022-11-14 | End: 2022-11-14 | Stop reason: HOSPADM

## 2022-11-14 RX ORDER — ONDANSETRON 2 MG/ML
4 INJECTION INTRAMUSCULAR; INTRAVENOUS
Status: DISCONTINUED | OUTPATIENT
Start: 2022-11-14 | End: 2022-11-14 | Stop reason: HOSPADM

## 2022-11-14 RX ADMIN — LIDOCAINE HYDROCHLORIDE 60 MG: 20 INJECTION, SOLUTION INFILTRATION; PERINEURAL at 10:28

## 2022-11-14 RX ADMIN — SODIUM CHLORIDE, POTASSIUM CHLORIDE, SODIUM LACTATE AND CALCIUM CHLORIDE: 600; 310; 30; 20 INJECTION, SOLUTION INTRAVENOUS at 10:23

## 2022-11-14 RX ADMIN — PROPOFOL 200 MCG/KG/MIN: 10 INJECTION, EMULSION INTRAVENOUS at 10:28

## 2022-11-14 ASSESSMENT — ACTIVITIES OF DAILY LIVING (ADL): ADLS_ACUITY_SCORE: 35

## 2022-11-14 NOTE — OP NOTE
PROCEDURE NOTE    DATE OF SERVICE: 11/14/2022    SURGEON: Cliff Veliz MD    PRE-OP DIAGNOSIS:    Healthcare maintenance   Rectal bleeding        POST-OP DIAGNOSIS:  Same  Normal Exam    PROCEDURE:   Colonoscopy        ANESTHESIA:  KOMAL Santos CRNA    INDICATION FOR THE PROCEDURE: The patient is a 47 year old male with rectal bleeding . The patient has no other complaints  . After explaining the risks to include bleeding, perforation, potential inability toreach the cecum, the patient wished to proceed.    PROCEDURE:After adequate sedation, the patient was in the left lateral decubitus position.  Rectal exam was performed.  There was normal tone and no palpable masses , no fissures or hemorrhoids.  The colonoscope was introduced into the rectum and advanced to the cecum with Mild difficulty.  The patient's prep was excellent.  The terminal cecum was reached.  The cecum, ascending, transverse, descending and sigmoid colon was entirely unremarkable .  The scope was retroflexed in the rectum.  The rectum was unremarkable  .  The scope was straightened and removed.  The patient tolerated the procedure well.     ESTIMATED BLOOD LOSS: none    COMPLICATIONS:  None    TISSUE REMOVED:  No    RECOMMEND:      Follow-up in 10 years      Cliff Veliz MD FACS

## 2022-11-14 NOTE — ANESTHESIA POSTPROCEDURE EVALUATION
Patient: Sonny Dubois    Procedure: Procedure(s):  COLONOSCOPY       Anesthesia Type:  MAC    Note:  Disposition: Outpatient   Postop Pain Control:            Sign Out: Well controlled pain   PONV: No   Neuro/Psych:             Sign Out: Acceptable/Baseline neuro status   Airway/Respiratory:             Sign Out: Acceptable/Baseline resp. status   CV/Hemodynamics:             Sign Out: Acceptable CV status   Other NRE: NONE   DID A NON-ROUTINE EVENT OCCUR? No           Last vitals:  Vitals Value Taken Time   /85 11/14/22 1110   Temp     Pulse 44 11/14/22 1110   Resp 16 11/14/22 1110   SpO2 100 % 11/14/22 1111       Electronically Signed By: David Kellerman, APRN CRNA  November 14, 2022  11:45 AM

## 2022-11-14 NOTE — DISCHARGE INSTRUCTIONS
[facility]  Adult Discharge Orders & Instructions    ________________________________________________________________          For 12 hours after surgery  Get plenty of rest.  A responsible adult must stay with you for at least 12 hours after you leave the hospital.   You may feel lightheaded.  IF so, sit for a few minutes before standing.  Have someone help you get up.   You may have a slight fever. Call the doctor if your fever is over 101 F (38.3 C) (taken under the tongue) or lasts longer than 24 hours.  You may have a dry mouth, a sore throat, muscle aches or trouble sleeping.  These should go away after 24 hours.  Do not make important or legal decisions.  6.   Do not drive or use heavy equipment.  If you have weakness or tingling, don't drive or use heavy equipment until this feeling goes away.    To contact a doctor, call   [phone]_______________________

## 2022-11-14 NOTE — H&P
History and Physical    CHIEF COMPLAINT / REASON FOR PROCEDURE:  Rectal bleeding    PERTINENT HISTORY   Patient is a 47 year old male who presents today for colonoscopy for rectal bleeding.   Last colonoscopy 2009.  Blood has been in stool, in bowel and when wipes. .    Past Medical History:   Diagnosis Date     Closed nondisplaced fracture of medial condyle of right tibia 07/08/2022     Low back pain     No Comments Provided     Past Surgical History:   Procedure Laterality Date     ADENOIDECTOMY      No Comments Provided     COLONOSCOPY  04/10/2009    normal, next due age 48     TYMPANOSTOMY, LOCAL/TOPICAL ANESTHESIA      PE tube placement       Bleeding tendencies:  No    ALLERGIES/SENSITIVITIES: No Known Allergies     CURRENT MEDICATIONS:    Prior to Admission medications    Medication Sig Start Date End Date Taking? Authorizing Provider   bisacodyl (DULCOLAX) 5 MG EC tablet Take 2 tablets (10 mg) by mouth daily as needed for constipation 11/1/22   Cliff Veliz MD   naproxen sodium (ANAPROX) 220 MG tablet Take 220 mg by mouth 2 times daily (with meals)    Reported, Patient       Physical Exam:  There were no vitals taken for this visit.  EXAM:  Chest/Respiratory Exam: Normal - Clear to auscultation without rales, rhonchi, or wheezing.  Cardiovascular Exam: normal, regular rate and rhythm        PLAN: COLONOSCOPY .  Patient understands risks of bleeding, perforation, potential inability to reach cecum, aspiration and wishes to proceed.

## 2022-11-14 NOTE — ANESTHESIA PREPROCEDURE EVALUATION
Anesthesia Pre-Procedure Evaluation    Patient: Sonny Dubois   MRN: [id number] : 1975        Procedure : Procedure(s):  COLONOSCOPY          Past Medical History:   Diagnosis Date     Closed nondisplaced fracture of medial condyle of right tibia 2022     Low back pain     No Comments Provided      Past Surgical History:   Procedure Laterality Date     ADENOIDECTOMY      No Comments Provided     COLONOSCOPY  04/10/2009    normal, next due age 48     TYMPANOSTOMY, LOCAL/TOPICAL ANESTHESIA      PE tube placement      No Known Allergies   Social History     Tobacco Use     Smoking status: Never     Smokeless tobacco: Never   Substance Use Topics     Alcohol use: Not Currently     Comment: socially      Wt Readings from Last 1 Encounters:   22 88.5 kg (195 lb)        Anesthesia Evaluation   Pt has had prior anesthetic.         ROS/MED HX  ENT/Pulmonary:  - neg pulmonary ROS     Neurologic: Comment: radiculitis       Cardiovascular: Comment: 12 Lead EKG 2022 - Sinus Joshua      METS/Exercise Tolerance: >4 METS    Hematologic:  - neg hematologic  ROS     Musculoskeletal: Comment: H/O fx      GI/Hepatic:  - neg GI/hepatic ROS     Renal/Genitourinary:  - neg Renal ROS     Endo:  - neg endo ROS     Psychiatric/Substance Use:  - neg psychiatric ROS     Infectious Disease:  - neg infectious disease ROS     Malignancy:  - neg malignancy ROS     Other:  - neg other ROS    (+) , H/O Chronic Pain,        Physical Exam    Airway        Mallampati: II   TM distance: > 3 FB   Neck ROM: full   Mouth opening: > 3 cm    Respiratory Devices and Support         Dental  no notable dental history         Cardiovascular   cardiovascular exam normal       Rhythm and rate: regular and normal     Pulmonary   pulmonary exam normal        breath sounds clear to auscultation           OUTSIDE LABS:  CBC:   Lab Results   Component Value Date    HGB 14.4 2013    HCT 41.8 2013     2013     BMP:    Lab Results   Component Value Date     07/12/2022    POTASSIUM 4.6 07/12/2022    CHLORIDE 105 07/12/2022    CO2 30 07/12/2022    BUN 18 07/12/2022    CR 0.99 07/12/2022    GLC 95 07/12/2022     COAGS: No results found for: PTT, INR, FIBR  POC: No results found for: BGM, HCG, HCGS  HEPATIC:   Lab Results   Component Value Date    ALBUMIN 4.2 07/12/2022    PROTTOTAL 7.0 07/12/2022    ALT 18 07/12/2022    AST 22 07/12/2022    ALKPHOS 51 07/12/2022    BILITOTAL 0.6 07/12/2022     OTHER:   Lab Results   Component Value Date    DANIEL 9.4 07/12/2022    SED 7 01/04/2013       Anesthesia Plan    ASA Status:  2   NPO Status:  NPO Appropriate    Anesthesia Type: MAC.     - Reason for MAC: straight local not clinically adequate              Consents    Anesthesia Plan(s) and associated risks, benefits, and realistic alternatives discussed. Questions answered and patient/representative(s) expressed understanding.     - Discussed: Risks, Benefits and Alternatives for the PROCEDURE were discussed     - Discussed with:  Patient         Postoperative Care            Comments:                David Kellerman, APRN CRNA

## 2022-11-14 NOTE — OR NURSING
Sky has been discharged to home at 1124 via ambulatory accompanied by wife    Written discharge instructions were provided to  and spouse.  Prescriptions were none.      Patient and adult caring for them verbalize understanding of discharge instructions including no driving until tomorrow and no longer taking narcotic pain medications - no operating mechanical equipment and no making any important decisions.They understand reason for discharge, and necessary follow-up appointments.

## 2022-11-14 NOTE — ANESTHESIA CARE TRANSFER NOTE
Patient: Sonny Dubois    Procedure: Procedure(s):  COLONOSCOPY       Diagnosis: Rectal bleeding [K62.5]  Diagnosis Additional Information: No value filed.    Anesthesia Type:   MAC     Note:    Oropharynx: oropharynx clear of all foreign objects  Level of Consciousness: awake  Oxygen Supplementation: room air    Independent Airway: airway patency satisfactory and stable  Dentition: dentition unchanged  Vital Signs Stable: post-procedure vital signs reviewed and stable  Report to RN Given: handoff report given  Patient transferred to: Phase II    Handoff Report: Identifed the Patient, Identified the Reponsible Provider, Reviewed the pertinent medical history, Discussed the surgical course, Reviewed Intra-OP anesthesia mangement and issues during anesthesia, Set expectations for post-procedure period and Allowed opportunity for questions and acknowledgement of understanding      Vitals:  Vitals Value Taken Time   BP     Temp     Pulse     Resp     SpO2         Electronically Signed By: OG MG CRNA  November 14, 2022  10:51 AM

## 2022-11-15 LAB
ATRIAL RATE - MUSE: 55 BPM
DIASTOLIC BLOOD PRESSURE - MUSE: NORMAL MMHG
INTERPRETATION ECG - MUSE: NORMAL
P AXIS - MUSE: -1 DEGREES
PR INTERVAL - MUSE: 168 MS
QRS DURATION - MUSE: 92 MS
QT - MUSE: 444 MS
QTC - MUSE: 424 MS
R AXIS - MUSE: 42 DEGREES
SYSTOLIC BLOOD PRESSURE - MUSE: NORMAL MMHG
T AXIS - MUSE: 20 DEGREES
VENTRICULAR RATE- MUSE: 55 BPM

## 2023-03-17 ENCOUNTER — OFFICE VISIT (OUTPATIENT)
Dept: FAMILY MEDICINE | Facility: OTHER | Age: 48
End: 2023-03-17
Attending: FAMILY MEDICINE
Payer: COMMERCIAL

## 2023-03-17 VITALS
WEIGHT: 189.6 LBS | BODY MASS INDEX: 28.73 KG/M2 | TEMPERATURE: 97.3 F | RESPIRATION RATE: 16 BRPM | SYSTOLIC BLOOD PRESSURE: 134 MMHG | DIASTOLIC BLOOD PRESSURE: 82 MMHG | OXYGEN SATURATION: 99 % | HEIGHT: 68 IN | HEART RATE: 54 BPM

## 2023-03-17 DIAGNOSIS — R79.89 ELEVATED TSH: ICD-10-CM

## 2023-03-17 DIAGNOSIS — I49.9 IRREGULAR CARDIAC RHYTHM: Primary | ICD-10-CM

## 2023-03-17 LAB
ALBUMIN SERPL BCG-MCNC: 4.4 G/DL (ref 3.5–5.2)
ALP SERPL-CCNC: 59 U/L (ref 40–129)
ALT SERPL W P-5'-P-CCNC: 31 U/L (ref 10–50)
ANION GAP SERPL CALCULATED.3IONS-SCNC: 8 MMOL/L (ref 7–15)
AST SERPL W P-5'-P-CCNC: 26 U/L (ref 10–50)
BILIRUB SERPL-MCNC: 0.6 MG/DL
BUN SERPL-MCNC: 13.2 MG/DL (ref 6–20)
CALCIUM SERPL-MCNC: 9.3 MG/DL (ref 8.6–10)
CHLORIDE SERPL-SCNC: 104 MMOL/L (ref 98–107)
CREAT SERPL-MCNC: 1.08 MG/DL (ref 0.67–1.17)
DEPRECATED HCO3 PLAS-SCNC: 28 MMOL/L (ref 22–29)
ERYTHROCYTE [DISTWIDTH] IN BLOOD BY AUTOMATED COUNT: 12.8 % (ref 10–15)
GFR SERPL CREATININE-BSD FRML MDRD: 84 ML/MIN/1.73M2
GLUCOSE SERPL-MCNC: 117 MG/DL (ref 70–99)
HBA1C MFR BLD: 5.4 % (ref 4–6.2)
HCT VFR BLD AUTO: 43.1 % (ref 40–53)
HGB BLD-MCNC: 14.5 G/DL (ref 13.3–17.7)
HOLD SPECIMEN: NORMAL
MAGNESIUM SERPL-MCNC: 2 MG/DL (ref 1.7–2.3)
MCH RBC QN AUTO: 31.3 PG (ref 26.5–33)
MCHC RBC AUTO-ENTMCNC: 33.6 G/DL (ref 31.5–36.5)
MCV RBC AUTO: 93 FL (ref 78–100)
PLATELET # BLD AUTO: 208 10E3/UL (ref 150–450)
POTASSIUM SERPL-SCNC: 4.3 MMOL/L (ref 3.4–5.3)
PROT SERPL-MCNC: 7.2 G/DL (ref 6.4–8.3)
RBC # BLD AUTO: 4.63 10E6/UL (ref 4.4–5.9)
SODIUM SERPL-SCNC: 140 MMOL/L (ref 136–145)
T4 FREE SERPL-MCNC: 1.08 NG/DL (ref 0.9–1.7)
TSH SERPL DL<=0.005 MIU/L-ACNC: 5.82 UIU/ML (ref 0.3–4.2)
WBC # BLD AUTO: 4.8 10E3/UL (ref 4–11)

## 2023-03-17 PROCEDURE — 84443 ASSAY THYROID STIM HORMONE: CPT | Mod: ZL | Performed by: FAMILY MEDICINE

## 2023-03-17 PROCEDURE — 83036 HEMOGLOBIN GLYCOSYLATED A1C: CPT | Mod: ZL | Performed by: FAMILY MEDICINE

## 2023-03-17 PROCEDURE — 99214 OFFICE O/P EST MOD 30 MIN: CPT | Performed by: FAMILY MEDICINE

## 2023-03-17 PROCEDURE — 86376 MICROSOMAL ANTIBODY EACH: CPT | Mod: ZL | Performed by: FAMILY MEDICINE

## 2023-03-17 PROCEDURE — 86800 THYROGLOBULIN ANTIBODY: CPT | Mod: ZL | Performed by: FAMILY MEDICINE

## 2023-03-17 PROCEDURE — 85027 COMPLETE CBC AUTOMATED: CPT | Mod: ZL | Performed by: FAMILY MEDICINE

## 2023-03-17 PROCEDURE — 36415 COLL VENOUS BLD VENIPUNCTURE: CPT | Mod: ZL | Performed by: FAMILY MEDICINE

## 2023-03-17 PROCEDURE — 80053 COMPREHEN METABOLIC PANEL: CPT | Mod: ZL | Performed by: FAMILY MEDICINE

## 2023-03-17 PROCEDURE — 84439 ASSAY OF FREE THYROXINE: CPT | Mod: ZL | Performed by: FAMILY MEDICINE

## 2023-03-17 PROCEDURE — 83735 ASSAY OF MAGNESIUM: CPT | Mod: ZL | Performed by: FAMILY MEDICINE

## 2023-03-17 ASSESSMENT — PAIN SCALES - GENERAL: PAINLEVEL: NO PAIN (0)

## 2023-03-17 NOTE — PROGRESS NOTES
"  Assessment & Plan       ICD-10-CM    1. Irregular cardiac rhythm  I49.9 Echocardiogram Complete     Comprehensive Metabolic Panel     TSH     CBC W PLT No Diff     Magnesium     Comprehensive Metabolic Panel     TSH     CBC W PLT No Diff     Magnesium     Hemoglobin A1c     Hemoglobin A1c     Adult Cardiology al [facility] Referral      2. Elevated TSH  R79.89 T4, Free     Thyroid peroxidase antibody     Anti thyroglobulin antibody     T4, Free        1.  I have personally reviewed the labs listed below.  2.  Differential diagnosis of symptoms includes A-fib, sick sinus syndrome, SVT, heart block.  Concerned that this may be subsequent effects of COVID/myocarditis.  Onset of symptoms more than 4 months ago.  He does not have concurrent signs of heart failure.  Labs are as below, additional thyroid testing pending.  3.  Portable cardiac telemetry ordered.  Also recommend echocardiogram and consult with cardiology.  4.  Additional evaluation of thyroid pending results.      Ordering of each unique test         BMI:   Estimated body mass index is 29.26 kg/m  as calculated from the following:    Height as of this encounter: 1.715 m (5' 7.5\").    Weight as of this encounter: 86 kg (189 lb 9.6 oz).           Return if symptoms worsen or fail to improve.    ANA CRISTINA ARREOLA MD  [facility]    Subjective   Sonny is a 47 year old, presenting for the following health issues:  Heart Problem (Erratic beat  )    Sonny Dubois is a 47 year old male in for evaluation of abnormal heart rate.  When he had his colonoscopy in early November - christine and tachycardia were noted on monitoring.  EKG was done.  He noticed some symptoms prior to this, but more so after.  The week after his colonoscopy he was diagnosed with COVID.    His HR will run from 30s to 160s.  When it is fast, he can sometimes feel this in his neck.    He is now monitoring more often.  He's worn a smart watch with activity, and " "it won't record a rhythm strip.    Any activity seems to cause it to go up.      Heart Problem    History of Present Illness       Reason for visit:  Heart  Symptom onset:  More than a month  Symptoms include:  High and low heart rates  Symptom intensity:  Mild  Symptom progression:  Staying the same  Had these symptoms before:  No  What makes it worse:  Worse with exertion  What makes it better:  No    He eats 2-3 servings of fruits and vegetables daily.He consumes 0 sweetened beverage(s) daily.He exercises with enough effort to increase his heart rate 20 to 29 minutes per day.  He exercises with enough effort to increase his heart rate 4 days per week.   He is taking medications regularly.         Current Outpatient Medications   Medication     naproxen sodium (ANAPROX) 220 MG tablet     No current facility-administered medications for this visit.     Past Medical History:   Diagnosis Date     Closed nondisplaced fracture of medial condyle of right tibia 07/08/2022     Low back pain     No Comments Provided     Family History   Problem Relation Age of Onset     Arthritis Father 33        Arthritis,RA     Rheumatoid Arthritis Father      Arthritis Brother         Arthritis,RA     Heart Disease Maternal Grandfather      Family History Negative Daughter         Good Health,born 1996     Family History Negative Daughter         Good Health,born 1998     Family History Negative Son         Good Health,born 2001         Review of Systems         Objective    /82   Pulse 54   Temp 97.3  F (36.3  C) (Tympanic)   Resp 16   Ht 1.715 m (5' 7.5\")   Wt 86 kg (189 lb 9.6 oz)   SpO2 99%   BMI 29.26 kg/m    Body mass index is 29.26 kg/m .  Physical Exam  Vitals and nursing note reviewed.   Constitutional:       Appearance: Normal appearance.   Cardiovascular:      Rate and Rhythm: Bradycardia present.      Heart sounds: No murmur heard.     Comments: After patient did 5 jumping jacks, HR was 80s with quick recovery " to baseline  Neurological:      Mental Status: He is alert.            Results for orders placed or performed in visit on 03/17/23   Comprehensive Metabolic Panel     Status: Abnormal   Result Value Ref Range    Sodium 140 136 - 145 mmol/L    Potassium 4.3 3.4 - 5.3 mmol/L    Chloride 104 98 - 107 mmol/L    Carbon Dioxide (CO2) 28 22 - 29 mmol/L    Anion Gap 8 7 - 15 mmol/L    Urea Nitrogen 13.2 6.0 - 20.0 mg/dL    Creatinine 1.08 0.67 - 1.17 mg/dL    Calcium 9.3 8.6 - 10.0 mg/dL    Glucose 117 (H) 70 - 99 mg/dL    Alkaline Phosphatase 59 40 - 129 U/L    AST 26 10 - 50 U/L    ALT 31 10 - 50 U/L    Protein Total 7.2 6.4 - 8.3 g/dL    Albumin 4.4 3.5 - 5.2 g/dL    Bilirubin Total 0.6 <=1.2 mg/dL    GFR Estimate 84 >60 mL/min/1.73m2   TSH     Status: Abnormal   Result Value Ref Range    TSH 5.82 (H) 0.30 - 4.20 uIU/mL   CBC W PLT No Diff     Status: Normal   Result Value Ref Range    WBC Count 4.8 4.0 - 11.0 10e3/uL    RBC Count 4.63 4.40 - 5.90 10e6/uL    Hemoglobin 14.5 13.3 - 17.7 g/dL    Hematocrit 43.1 40.0 - 53.0 %    MCV 93 78 - 100 fL    MCH 31.3 26.5 - 33.0 pg    MCHC 33.6 31.5 - 36.5 g/dL    RDW 12.8 10.0 - 15.0 %    Platelet Count 208 150 - 450 10e3/uL   Magnesium     Status: Normal   Result Value Ref Range    Magnesium 2.0 1.7 - 2.3 mg/dL   Extra Tube     Status: None    Narrative    The following orders were created for panel order Extra Tube.  Procedure                               Abnormality         Status                     ---------                               -----------         ------                     Extra Serum Separator Tu...[[id number]]                      Final result                 Please view results for these tests on the individual orders.   Extra Serum Separator Tube (SST)     Status: None   Result Value Ref Range    Hold Specimen [facility]

## 2023-03-17 NOTE — NURSING NOTE
"Chief Complaint   Patient presents with     Heart Problem     Erratic beat       Patient is here for an erratic heartbeat.It will be low and then go high.    Initial /82   Pulse 54   Temp 97.3  F (36.3  C) (Tympanic)   Resp 16   Ht 1.715 m (5' 7.5\")   Wt 86 kg (189 lb 9.6 oz)   SpO2 99%   BMI 29.26 kg/m   Estimated body mass index is 29.26 kg/m  as calculated from the following:    Height as of this encounter: 1.715 m (5' 7.5\").    Weight as of this encounter: 86 kg (189 lb 9.6 oz).  Medication Reconciliation: complete    Marimar Walker CMA       FOOD SECURITY SCREENING QUESTIONS:    The next two questions are to help us understand your food security.  If you are feeling you need any assistance in this area, we have resources available to support you today.    Hunger Vital Signs:  Within the past 12 months we worried whether our food would run out before we got money to buy more. Never  Within the past 12 months the food we bought just didn't last and we didn't have money to get more. Never  Marimar Walker CMA,LPN on 3/17/2023 at 3:09 PM      "

## 2023-03-20 ENCOUNTER — MYC MEDICAL ADVICE (OUTPATIENT)
Dept: FAMILY MEDICINE | Facility: OTHER | Age: 48
End: 2023-03-20
Payer: COMMERCIAL

## 2023-03-20 DIAGNOSIS — I49.9 IRREGULAR CARDIAC RHYTHM: Primary | ICD-10-CM

## 2023-03-20 DIAGNOSIS — E06.3 HASHIMOTO'S THYROIDITIS: Primary | ICD-10-CM

## 2023-03-20 LAB
THYROGLOB AB SERPL IA-ACNC: <20 IU/ML
THYROPEROXIDASE AB SERPL-ACNC: 275 IU/ML

## 2023-03-28 ENCOUNTER — HOSPITAL ENCOUNTER (OUTPATIENT)
Dept: CARDIOLOGY | Facility: OTHER | Age: 48
Discharge: HOME | End: 2023-03-28
Attending: FAMILY MEDICINE
Payer: COMMERCIAL

## 2023-03-28 DIAGNOSIS — I49.9 IRREGULAR CARDIAC RHYTHM: ICD-10-CM

## 2023-03-28 LAB — LVEF ECHO: NORMAL

## 2023-03-28 PROCEDURE — 93306 TTE W/DOPPLER COMPLETE: CPT | Mod: 26 | Performed by: STUDENT IN AN ORGANIZED HEALTH CARE EDUCATION/TRAINING PROGRAM

## 2023-03-28 PROCEDURE — 999N000157 HC STATISTIC RCP TIME EA 10 MIN

## 2023-03-28 PROCEDURE — 93246 EXT ECG>7D<15D RECORDING: CPT

## 2023-03-28 PROCEDURE — 93248 EXT ECG>7D<15D REV&INTERPJ: CPT | Performed by: INTERNAL MEDICINE

## 2023-03-28 PROCEDURE — 93306 TTE W/DOPPLER COMPLETE: CPT

## 2023-03-28 NOTE — PATIENT INSTRUCTIONS
Date Placed on Pt:  03/28/2023    Patient instructed not to:   -take baths, swim, sauna   -remove device prior to 14 days   -use electric blankets   -shower or sweat excessively within first 24 hours of device application  Patient instructed to:   -shower as needed   -be carefull when toweling off and dressing   -press button when cardiac symptoms occur   -document symptoms in log book   -remove and return device (send via mail to [employer])   -Call [facility] with any questions

## 2023-04-17 DIAGNOSIS — I49.9 IRREGULAR CARDIAC RHYTHM: Primary | ICD-10-CM

## 2023-04-18 ENCOUNTER — LAB (OUTPATIENT)
Dept: LAB | Facility: OTHER | Age: 48
End: 2023-04-18
Attending: FAMILY MEDICINE
Payer: COMMERCIAL

## 2023-04-18 DIAGNOSIS — E06.3 HASHIMOTO'S THYROIDITIS: ICD-10-CM

## 2023-04-18 DIAGNOSIS — I49.9 IRREGULAR CARDIAC RHYTHM: ICD-10-CM

## 2023-04-18 LAB
T4 FREE SERPL-MCNC: 1.08 NG/DL (ref 0.9–1.7)
TSH SERPL DL<=0.005 MIU/L-ACNC: 5.54 UIU/ML (ref 0.3–4.2)

## 2023-04-18 PROCEDURE — 86038 ANTINUCLEAR ANTIBODIES: CPT | Mod: ZL

## 2023-04-18 PROCEDURE — 36415 COLL VENOUS BLD VENIPUNCTURE: CPT | Mod: ZL

## 2023-04-18 PROCEDURE — 84443 ASSAY THYROID STIM HORMONE: CPT | Mod: ZL

## 2023-04-18 PROCEDURE — 84439 ASSAY OF FREE THYROXINE: CPT | Mod: ZL

## 2023-04-18 PROCEDURE — 86618 LYME DISEASE ANTIBODY: CPT | Mod: ZL

## 2023-04-19 LAB — B BURGDOR IGG+IGM SER QL: 0.13

## 2023-04-20 LAB — ANA SER QL IF: NEGATIVE

## 2023-04-26 ENCOUNTER — HOSPITAL ENCOUNTER (OUTPATIENT)
Dept: GENERAL RADIOLOGY | Facility: OTHER | Age: 48
Discharge: HOME | End: 2023-04-26
Attending: NURSE PRACTITIONER
Payer: COMMERCIAL

## 2023-04-26 ENCOUNTER — OFFICE VISIT (OUTPATIENT)
Dept: CARDIOLOGY | Facility: OTHER | Age: 48
End: 2023-04-26
Attending: FAMILY MEDICINE
Payer: COMMERCIAL

## 2023-04-26 VITALS
TEMPERATURE: 97.8 F | SYSTOLIC BLOOD PRESSURE: 100 MMHG | DIASTOLIC BLOOD PRESSURE: 78 MMHG | HEART RATE: 56 BPM | BODY MASS INDEX: 28.86 KG/M2 | WEIGHT: 187 LBS | OXYGEN SATURATION: 98 % | RESPIRATION RATE: 16 BRPM

## 2023-04-26 DIAGNOSIS — R00.0 SINUS TACHYCARDIA: Primary | ICD-10-CM

## 2023-04-26 DIAGNOSIS — Z83.2 FAMILY HISTORY OF SARCOIDOSIS: ICD-10-CM

## 2023-04-26 DIAGNOSIS — R53.83 FATIGUE, UNSPECIFIED TYPE: ICD-10-CM

## 2023-04-26 DIAGNOSIS — U07.1 INFECTION DUE TO 2019 NOVEL CORONAVIRUS: ICD-10-CM

## 2023-04-26 DIAGNOSIS — I49.8 VENTRICULAR BIGEMINY: ICD-10-CM

## 2023-04-26 DIAGNOSIS — R00.0 SINUS TACHYCARDIA: ICD-10-CM

## 2023-04-26 DIAGNOSIS — E78.5 MILD HYPERLIPIDEMIA: ICD-10-CM

## 2023-04-26 DIAGNOSIS — I47.10 PAROXYSMAL SUPRAVENTRICULAR TACHYCARDIA (H): ICD-10-CM

## 2023-04-26 LAB
CRP SERPL-MCNC: <3 MG/L
ERYTHROCYTE [SEDIMENTATION RATE] IN BLOOD BY WESTERGREN METHOD: 5 MM/HR (ref 0–15)
TROPONIN T SERPL HS-MCNC: <6 NG/L

## 2023-04-26 PROCEDURE — 84484 ASSAY OF TROPONIN QUANT: CPT | Mod: ZL | Performed by: NURSE PRACTITIONER

## 2023-04-26 PROCEDURE — 36415 COLL VENOUS BLD VENIPUNCTURE: CPT | Mod: ZL | Performed by: NURSE PRACTITIONER

## 2023-04-26 PROCEDURE — 86140 C-REACTIVE PROTEIN: CPT | Mod: ZL | Performed by: NURSE PRACTITIONER

## 2023-04-26 PROCEDURE — 99205 OFFICE O/P NEW HI 60 MIN: CPT | Performed by: NURSE PRACTITIONER

## 2023-04-26 PROCEDURE — 85652 RBC SED RATE AUTOMATED: CPT | Mod: ZL | Performed by: NURSE PRACTITIONER

## 2023-04-26 PROCEDURE — 93000 ELECTROCARDIOGRAM COMPLETE: CPT | Performed by: INTERNAL MEDICINE

## 2023-04-26 PROCEDURE — 71046 X-RAY EXAM CHEST 2 VIEWS: CPT

## 2023-04-26 ASSESSMENT — PAIN SCALES - GENERAL: PAINLEVEL: NO PAIN (0)

## 2023-04-26 NOTE — NURSING NOTE
"Chief Complaint   Patient presents with     Consult     Irregular cardiac rhythm       Initial /78 (BP Location: Right arm, Patient Position: Sitting, Cuff Size: Adult Large)   Pulse 56   Temp 97.8  F (36.6  C) (Tympanic)   Resp 16   Wt 84.8 kg (187 lb)   SpO2 98%   BMI 28.86 kg/m   Estimated body mass index is 28.86 kg/m  as calculated from the following:    Height as of 3/17/23: 1.715 m (5' 7.5\").    Weight as of this encounter: 84.8 kg (187 lb).  Meds Reconciled: complete  Pt is not on Aspirin  Pt is not on a Statin  PHQ and/or ROGE reviewed. Pt referred to PCP/MH Provider as appropriate.    Jessy Newman LPN    "

## 2023-04-26 NOTE — PATIENT INSTRUCTIONS
1.  You will receive a phone call to schedule stress echocardiogram.  2. Labs and chest Xray today, we will notify you of results.     Follow-up with cardiology clinic in 4-6 weeks.

## 2023-04-26 NOTE — PROGRESS NOTES
[facility] HEART CARE   CARDIOLOGY CONSULT     Sonny Dubois   [address] [facility] [address]    Sona Gutierrez     Chief Complaint   Patient presents with     Consult     Irregular cardiac rhythm        HPI:   Mr. Dubois is a 47 year old male who presents for cardiology evaluation with irregular heart rhythm.  Patient with limited past medical history which includes Hashimoto's thyroiditis, fracture of the left tibia and lumbosacral radiculitis. Family history notable for ischemic heart disease, maternal grandfather with history of MI, paternal cousins with ischemic heart disease noted in their 50s. Sister with history of sarcoidosis, not identified to be intracardiac.    Patient underwent colonoscopy in early November, noted bradycardia and tachycardia, heart rate variability on continuous monitoring.  ECG that day revealing sinus bradycardia with occasional PVCs, rate 55 bpm, possible anterior infarct.  Of note, the week after his colonoscopy patient was diagnosed with COVID-19 for the second time, initial COVID infection March 2021.  He has noted his heart rate continues to run 30-1 60s bpm with much rate rate variability.  With tachypalpitations, he often feels fullness in his neck.  He has described himself to be more tachycardic with activity than would be appropriate for the activity he is doing.    He has not experienced any chest pain or pressure, no pain in the arm, jaw, neck or back.  Positive for palpitations with [id number] rate variability, see above.  No associated lightheadedness or syncope.  He has not experienced any increased dyspnea at rest or with exertion.  No edema or weight gain.  No reports of orthopnea or PND.    ZIO completed for just short of two weeks revealing predominantly sinus rhythm with rates averaging 65 bpm the heart rate ranged from 38 to 146 bpm in sinus rhythm.  1 episode of SVT lasting 10.5 seconds for which the patient was not symptomatic.  No  symptomatic bradycardia, pauses, second-degree Mobitz type II or third-degree AV block.  SVE and VE were present as isolated beats, couplets and triplets-less than 1% burden.  Ventricular bigeminy and ventricular trigeminy were present on this monitor.    RELEVANT TESTING REVIEWED:  Echocardiogram 3/17/2023:  Interpretation Summary  Global and regional left ventricular function is normal with an EF of 55-60%.  Global right ventricular function is normal. The right ventricle is normal  size.  No significant valvular abnormalities.  IVC diameter <2.1 cm collapsing >50% with sniff suggests a normal RA pressure  of 3 mmHg.  There is no prior study for direct comparison.    ZIO 3/28 - 4/11/2023:  Findings: Patient's monitor was in place for 13 days and 15 hours.  Minimum heart rate 38 bpm, average heart rate 65 bpm, maximum heart rate 167 bpm. Rhythm/s present include sinus rhythm, supraventricular tachycardia. There were rare ventricular ectopic beats accounting for <1% of all beats. There were rare ventricular triplets and rare couplets.  Ventricular bigeminy and trigeminy were present.  There were rare supraventricular ectopic beats.  There were 2 triggered events and 0 diary entries during which time the noted rhythms were sinus.  There was 1 episode of supraventricular tachycardia that may possibly have been atrial tachycardia with variable block.  Review of actual tracings showed no other significant abnormality.  Impression: Zio patch monitor showing predominantly sinus rhythm.    Fern Anderson DO      PAST MEDICAL HISTORY:   Past Medical History:   Diagnosis Date     Closed nondisplaced fracture of medial condyle of right tibia 07/08/2022     Low back pain     No Comments Provided          FAMILY HISTORY:   Family History   Problem Relation Age of Onset     Arthritis Father 33        Arthritis,RA     Rheumatoid Arthritis Father      Arthritis Brother         Arthritis,RA     Heart Disease Maternal Grandfather       Family History Negative Daughter         Good Health,born 1996     Family History Negative Daughter         Good Health,born 1998     Family History Negative Son         Good Health,born 2001          PAST SURGICAL HISTORY:   Past Surgical History:   Procedure Laterality Date     ADENOIDECTOMY      No Comments Provided     COLONOSCOPY  04/10/2009    normal, next due age 48     COLONOSCOPY  11/14/2022    F/U 2032 normal     COLONOSCOPY N/A 11/14/2022    normal colonoscopy, follow up 11/14/32     TYMPANOSTOMY, LOCAL/TOPICAL ANESTHESIA      PE tube placement          SOCIAL HISTORY:   Social History     Socioeconomic History     Marital status:      Spouse name: Prabha     Number of children: 4     Years of education: 12+     Highest education level: High school graduate   Occupational History     Employer: [employer]   Tobacco Use     Smoking status: Never     Smokeless tobacco: Never   Vaping Use     Vaping status: Never Used   Substance and Sexual Activity     Alcohol use: Not Currently     Comment: socially     Drug use: Never     Sexual activity: Yes     Partners: Female   Social History Narrative    .  Had DOT physical done 07/14/09.      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     Wife works at [employer].    Children:  Melvin (dental school 2022); Skye (medschool 2022); Rosas (D 2022)          CURRENT MEDICATIONS:   Prior to Admission medications    Not on File          ALLERGIES:   Allergies   Allergen Reactions     Other Food Allergy      Almonds           ROS:   CONSTITUTIONAL: No reported fever or chills. No changes in weight.  ENT: No visual disturbance, ear ache, epistaxis or sore throat.   CARDIOVASCULAR: No chest pain, chest pressure or chest discomfort. Positive for palpitations, irregular heart beat- see HPI. No lower extremity edema.   RESPIRATORY: No shortness of breath, dyspnea upon exertion, cough, wheezing or hemoptysis.  No  reported orthopnea or PND.  GI: No reported abdominal pain, melena or hematochezia.    : No reported hematuria or dysuria.   NEUROLOGICAL: No headache, lightheadedness, dizziness, syncope, ataxia, paresthesias or weakness.   HEMATOLOGIC: No history of anemia. No bleeding or excessive bruising. No history of blood clots.   MUSCULOSKELETAL: No new joint pain or swelling, no muscle pain.   ENDOCRINOLOGIC: No temperature intolerance. No hair or skin changes.  SKIN: No abnormal rashes or sores, no unusual itching.      PHYSICAL EXAM:   /78 (BP Location: Right arm, Patient Position: Sitting, Cuff Size: Adult Large)   Pulse 56   Temp 97.8  F (36.6  C) (Tympanic)   Resp 16   Wt 84.8 kg (187 lb)   SpO2 98%   BMI 28.86 kg/m    GENERAL: The patient is a well-developed, well-nourished, in no apparent distress.  HEENT: Head is normocephalic and atraumatic. Eyes are symmetrical with normal visual tracking. No icterus, no xanthelasmas. Nares appeared normal without nasal drainage. Mucous membranes are moist, no cyanosis.  NECK: Supple, no cervical bruits, JVP not visible.   CHEST/ LUNGS: Lungs clear to auscultation, no rales, rhonchi or wheezes, no use of accessory muscles, no retractions, respirations unlabored and normal respiratory rate.   CARDIO: Regular rate and rhythm normal with S1 and S2, no S3 or S4 and no murmur, click or rub. Precordium quiet with normal PMI.    ABD: Abdomen is nondistended, aorta not enlarged to palpitation and no abdominal bruits heard.   EXTREMITIES: No edema present, pedal pulses normal and equal bilaterally.  MUSCULOSKELETAL: No visible joint swelling.   NEUROLOGIC: Alert and oriented X3. Normal speech, gait and affect. No focal neurologic deficits.   SKIN: No jaundice. No rashes or visible skin lesions present. No ecchymosis.       EKG:    Sinus bradycardia, rate 47 bpm, possible septal infarct.  Compared to prior ECG, PVCs no longer present.    LAB RESULTS:   Lab on 04/18/2023    Component Date Value Ref Range Status     HU interpretation 04/18/2023 Negative  Negative Final     Lyme Disease Antibodies Total 04/18/2023 0.13  <0.90 Final     Free T4 04/18/2023 1.08  0.90 - 1.70 ng/dL Final     TSH 04/18/2023 5.54 (H)  0.30 - 4.20 uIU/mL Final   Office Visit on 03/17/2023   Component Date Value Ref Range Status     Sodium 03/17/2023 140  136 - 145 mmol/L Final     Potassium 03/17/2023 4.3  3.4 - 5.3 mmol/L Final     Chloride 03/17/2023 104  98 - 107 mmol/L Final     Carbon Dioxide (CO2) 03/17/2023 28  22 - 29 mmol/L Final     Anion Gap 03/17/2023 8  7 - 15 mmol/L Final     Urea Nitrogen 03/17/2023 13.2  6.0 - 20.0 mg/dL Final     Creatinine 03/17/2023 1.08  0.67 - 1.17 mg/dL Final     Calcium 03/17/2023 9.3  8.6 - 10.0 mg/dL Final     Glucose 03/17/2023 117 (H)  70 - 99 mg/dL Final     Alkaline Phosphatase 03/17/2023 59  40 - 129 U/L Final     AST 03/17/2023 26  10 - 50 U/L Final     ALT 03/17/2023 31  10 - 50 U/L Final     Protein Total 03/17/2023 7.2  6.4 - 8.3 g/dL Final     Albumin 03/17/2023 4.4  3.5 - 5.2 g/dL Final     Bilirubin Total 03/17/2023 0.6  <=1.2 mg/dL Final     GFR Estimate 03/17/2023 84  >60 mL/min/1.73m2 Final     TSH 03/17/2023 5.82 (H)  0.30 - 4.20 uIU/mL Final     WBC Count 03/17/2023 4.8  4.0 - 11.0 10e3/uL Final     RBC Count 03/17/2023 4.63  4.40 - 5.90 10e6/uL Final     Hemoglobin 03/17/2023 14.5  13.3 - 17.7 g/dL Final     Hematocrit 03/17/2023 43.1  40.0 - 53.0 % Final     MCV 03/17/2023 93  78 - 100 fL Final     MCH 03/17/2023 31.3  26.5 - 33.0 pg Final     MCHC 03/17/2023 33.6  31.5 - 36.5 g/dL Final     RDW 03/17/2023 12.8  10.0 - 15.0 % Final     Platelet Count 03/17/2023 208  150 - 450 10e3/uL Final     Magnesium 03/17/2023 2.0  1.7 - 2.3 mg/dL Final     Hold Specimen 03/17/2023 [facility]   Final     Hemoglobin A1C 03/17/2023 5.4  4.0 - 6.2 % Final     Free T4 03/17/2023 1.08  0.90 - 1.70 ng/dL Final     Thyroglobulin Antibody 03/17/2023 <20  <40 IU/mL Final      Thyroid Peroxidase Antibody 03/17/2023 275 (H)  <35 IU/mL Final          ASSESSMENT:   Sonny Dubois presents for cardiology evaluation with irregular heart rhythm, rate variability.  Patient with limited past medical history which includes Hashimoto's thyroiditis, fracture of the left tibia and lumbosacral radiculitis. Family history notable for ischemic heart disease, maternal grandfather with history of MI, paternal cousins with ischemic heart disease noted in their 50s. Sister with history of sarcoidosis, not identified to be intracardiac.    1. Sinus tachycardia  - Echo Stress Echocardiogram; Future  - CRP inflammation; Future  - X-ray Chest 2 vws*; Future  - CRP inflammation  - Erythrocyte sedimentation rate auto  - Troponin T, High Sensitivity    2. Ventricular bigeminy  - Echo Stress Echocardiogram; Future    3. Paroxysmal supraventricular tachycardia (H)    4. Fatigue, unspecified type    5. Mild hyperlipidemia    6. Infection due to 2019 novel coronavirus  - X-ray Chest 2 vws*; Future  - CRP inflammation  - Erythrocyte sedimentation rate auto  - Troponin T, High Sensitivity    7. Family history of sarcoidosis  - X-ray Chest 2 vws*; Future    PLAN:   1. Patient with palpitations, rate variability dating back to this past November 2023.  Inappropriate tachycardic response to physical activity, this is a change from his baseline. S/p COVID, there was concern for possible COVID related pericarditis. He is largely asymptomatic other than palpitations.   2. He does have family history significant for ischemic heart disease, given heart rate abnormalities, ventricular ectopy, ventricular bigeminy and ventricular trigeminy, along with ECG revealing possible septal infarct, patient will proceed with exercise stress echocardiogram.  3. TTE reviewed from 3/28/2023-normal biventricular function, LVEF 55 to 60%, no LV RWMA's, no hemodynamically significant valvular disease and no pericardial effusion.  4.  Reviewed ZIO revealing predominantly sinus rhythm with rates averaging 65 bpm the heart rate ranged from 38 to 146 bpm in sinus rhythm.  1 episode of SVT lasting 10.5 seconds for which the patient was not symptomatic.  No symptomatic bradycardia, pauses, second-degree Mobitz type II or third-degree AV block.  SVE and VE were present as isolated beats, couplets and triplets-less than 1% burden.  Ventricular bigeminy and ventricular trigeminy were present on this monitor.  5. Additional lab today d/t concern for post-COVID symptoms, see orders.   6. Recent labs reviewed with stable thyroid function based on free T4, negative Lyme disease antibody testing and negative HU.    Orders Placed This Encounter   Procedures     X-ray Chest 2 vws*     CRP inflammation     Erythrocyte sedimentation rate auto     Troponin T, High Sensitivity     EKG 12-lead, tracing only (Same Day)     Echo Stress Echocardiogram       Thank you for allowing me to participate in the care of your patient. Please do not hesitate to contact me if you have any questions.     Total time 62 minutes on date of encounter spent reviewing records, face-to-face time obtaining HPI, physical exam, reviewing results of recent testing counseling on the above diagnoses and recommended plan of care.    Faby Hogan, APRN CNP [facility]

## 2023-05-01 LAB
ATRIAL RATE - MUSE: 47 BPM
DIASTOLIC BLOOD PRESSURE - MUSE: NORMAL MMHG
INTERPRETATION ECG - MUSE: NORMAL
P AXIS - MUSE: 17 DEGREES
PR INTERVAL - MUSE: 160 MS
QRS DURATION - MUSE: 82 MS
QT - MUSE: 458 MS
QTC - MUSE: 405 MS
R AXIS - MUSE: 40 DEGREES
SYSTOLIC BLOOD PRESSURE - MUSE: NORMAL MMHG
T AXIS - MUSE: 36 DEGREES
VENTRICULAR RATE- MUSE: 47 BPM

## 2023-05-09 ENCOUNTER — TELEPHONE (OUTPATIENT)
Dept: CARDIOLOGY | Facility: OTHER | Age: 48
End: 2023-05-09
Payer: COMMERCIAL

## 2023-05-09 NOTE — TELEPHONE ENCOUNTER
Patient verified .  Due to cancellation able to get patient earlier appointment for stress test.  New Appointment 23 at 2 PM.  with instructions given.  Patient verbalized understanding.

## 2023-05-15 ENCOUNTER — TELEPHONE (OUTPATIENT)
Dept: FAMILY MEDICINE | Facility: OTHER | Age: 48
End: 2023-05-15
Payer: COMMERCIAL

## 2023-05-15 DIAGNOSIS — E06.3 HASHIMOTO'S THYROIDITIS: Primary | ICD-10-CM

## 2023-05-15 NOTE — TELEPHONE ENCOUNTER
Message to patient.  TSH   Date Value Ref Range Status   04/18/2023 5.54 (H) 0.30 - 4.20 uIU/mL Final     Sona Smith MD

## 2023-05-22 ENCOUNTER — HOSPITAL ENCOUNTER (OUTPATIENT)
Dept: CARDIOLOGY | Facility: OTHER | Age: 48
Discharge: HOME | End: 2023-05-22
Attending: NURSE PRACTITIONER | Admitting: NURSE PRACTITIONER
Payer: COMMERCIAL

## 2023-05-22 VITALS — SYSTOLIC BLOOD PRESSURE: 116 MMHG | DIASTOLIC BLOOD PRESSURE: 78 MMHG | HEART RATE: 55 BPM | RESPIRATION RATE: 16 BRPM

## 2023-05-22 DIAGNOSIS — R53.83 FATIGUE, UNSPECIFIED TYPE: ICD-10-CM

## 2023-05-22 DIAGNOSIS — I49.8 VENTRICULAR BIGEMINY: ICD-10-CM

## 2023-05-22 DIAGNOSIS — R00.0 SINUS TACHYCARDIA: ICD-10-CM

## 2023-05-22 PROCEDURE — 93325 DOPPLER ECHO COLOR FLOW MAPG: CPT | Mod: 26 | Performed by: INTERNAL MEDICINE

## 2023-05-22 PROCEDURE — 93350 STRESS TTE ONLY: CPT | Mod: 26 | Performed by: INTERNAL MEDICINE

## 2023-05-22 PROCEDURE — 93321 DOPPLER ECHO F-UP/LMTD STD: CPT | Mod: TC

## 2023-05-22 PROCEDURE — 93016 CV STRESS TEST SUPVJ ONLY: CPT | Performed by: STUDENT IN AN ORGANIZED HEALTH CARE EDUCATION/TRAINING PROGRAM

## 2023-05-22 PROCEDURE — 93018 CV STRESS TEST I&R ONLY: CPT | Performed by: STUDENT IN AN ORGANIZED HEALTH CARE EDUCATION/TRAINING PROGRAM

## 2023-05-22 PROCEDURE — 255N000002 HC RX 255 OP 636: Performed by: NURSE PRACTITIONER

## 2023-05-22 PROCEDURE — 93017 CV STRESS TEST TRACING ONLY: CPT

## 2023-05-22 PROCEDURE — 999N000208 ECHO STRESS ECHOCARDIOGRAM

## 2023-05-22 PROCEDURE — 93321 DOPPLER ECHO F-UP/LMTD STD: CPT | Mod: 26 | Performed by: INTERNAL MEDICINE

## 2023-05-22 RX ADMIN — PERFLUTREN 3 ML: 6.52 INJECTION, SUSPENSION INTRAVENOUS at 15:30

## 2023-05-22 NOTE — PROGRESS NOTES
14:10  The patient arrived for a stress echo.  The procedure, risks and benefits were discussed and the consent was signed.  The patient was prepped for the stress test, and an IV was placed per procedure.  The echo sonographer did the initial images with definity for image enhancement.    arrived, and the patient biked 12 minutes and 51 seconds.  The patient tolerated the procedure.  Stress images were completed and the IV was removed per procedure.  The patient was released in stable condition.  The patient was instructed that the ordering MD will call with results in one to two days.  If you have not received your results within 3 days please call the ordering provider.  Please see the chart for complete test results.

## 2023-05-24 ENCOUNTER — OFFICE VISIT (OUTPATIENT)
Dept: CARDIOLOGY | Facility: OTHER | Age: 48
End: 2023-05-24
Attending: NURSE PRACTITIONER
Payer: COMMERCIAL

## 2023-05-24 VITALS
RESPIRATION RATE: 14 BRPM | OXYGEN SATURATION: 97 % | WEIGHT: 184.2 LBS | BODY MASS INDEX: 27.92 KG/M2 | HEIGHT: 68 IN | TEMPERATURE: 98.1 F | SYSTOLIC BLOOD PRESSURE: 104 MMHG | DIASTOLIC BLOOD PRESSURE: 80 MMHG | HEART RATE: 66 BPM

## 2023-05-24 DIAGNOSIS — I49.8 VENTRICULAR BIGEMINY: ICD-10-CM

## 2023-05-24 DIAGNOSIS — E06.3 HASHIMOTO'S THYROIDITIS: Primary | ICD-10-CM

## 2023-05-24 DIAGNOSIS — E06.3 HASHIMOTO'S THYROIDITIS: ICD-10-CM

## 2023-05-24 DIAGNOSIS — I49.5 TACHYCARDIA-BRADYCARDIA (H): ICD-10-CM

## 2023-05-24 DIAGNOSIS — R53.83 FATIGUE, UNSPECIFIED TYPE: ICD-10-CM

## 2023-05-24 DIAGNOSIS — U07.1 INFECTION DUE TO 2019 NOVEL CORONAVIRUS: ICD-10-CM

## 2023-05-24 DIAGNOSIS — R94.31 ABNORMAL ECG DURING EXERCISE STRESS TEST: Primary | ICD-10-CM

## 2023-05-24 LAB — TSH SERPL DL<=0.005 MIU/L-ACNC: 7.39 UIU/ML (ref 0.3–4.2)

## 2023-05-24 PROCEDURE — 99214 OFFICE O/P EST MOD 30 MIN: CPT | Performed by: NURSE PRACTITIONER

## 2023-05-24 PROCEDURE — 36415 COLL VENOUS BLD VENIPUNCTURE: CPT | Mod: ZL | Performed by: NURSE PRACTITIONER

## 2023-05-24 PROCEDURE — 84443 ASSAY THYROID STIM HORMONE: CPT | Mod: ZL | Performed by: NURSE PRACTITIONER

## 2023-05-24 RX ORDER — LEVOTHYROXINE SODIUM 25 UG/1
25 TABLET ORAL DAILY
Qty: 90 TABLET | Refills: 3 | Status: SHIPPED | OUTPATIENT
Start: 2023-05-24 | End: 2023-08-28

## 2023-05-24 ASSESSMENT — PAIN SCALES - GENERAL: PAINLEVEL: NO PAIN (0)

## 2023-05-24 NOTE — PROGRESS NOTES
[facility] HEART CARE   CARDIOLOGY PROGRESS NOTE    Sonny Dubois   [address] [facility] [address]    Sona Gutierrez     Chief Complaint   Patient presents with     Follow Up     Stress test results         HPI:   Mr. Dubois is a 47 year old male who presents for cardiology follow-up to visit on 4/26/2023. He was initially evaluated by cardiology with irregular heart rhythm.  Patient with limited past medical history which includes Hashimoto's thyroiditis, fracture of the left tibia and lumbosacral radiculitis. Family history notable for ischemic heart disease, maternal grandfather with history of MI, paternal cousins with ischemic heart disease noted in their 50s. Sister with history of sarcoidosis, not identified to be intracardiac.    Patient underwent colonoscopy in early November (2022), noted bradycardia and tachycardia, heart rate variability on continuous monitoring.  ECG that day revealing sinus bradycardia with occasional PVCs, rate 55 bpm, possible anterior infarct.  Of note, the week after his colonoscopy patient was diagnosed with COVID-19 for the second time, initial COVID infection March 2021.  He had noted his heart rate continued to run 30-160s bpm with much rate variability.  With tachypalpitations, he often feels fullness in his neck.  He had described himself to be increasingly tachycardic with activity than would be appropriate for the activity he is doing.    He has not experienced any chest pain or pressure, no pain in the arm, jaw, neck or back.  Positive for palpitations with [id number] rate variability, see above.  No associated lightheadedness or syncope.  He has not experienced any increased dyspnea at rest or with exertion.  No edema or weight gain.  No reports of orthopnea or PND.    KHADIJAHO completed for just short of two weeks revealing predominantly sinus rhythm with rates averaging 65 bpm the heart rate ranged from 38 to 146 bpm in sinus rhythm.  1 episode of  "SVT lasting 10.5 seconds for which the patient was not symptomatic.  No symptomatic bradycardia, pauses, second-degree Mobitz type II or third-degree AV block.  SVE and VE were present as isolated beats, couplets and triplets-less than 1% burden.  Ventricular bigeminy and ventricular trigeminy were present on this monitor.    INTERVAL HISTORY:  Today, patient reports short time that symptoms did improve. Over last week he has had recurrence of palpitations with rate variability. Describes \"not feeling right\". No increased dyspnea, no new chest pain or pressure. No lightheadedness or syncope.     RELEVANT TESTING REVIEWED:  Echocardiogram 3/17/2023:  Interpretation Summary  Global and regional left ventricular function is normal with an EF of 55-60%.  Global right ventricular function is normal. The right ventricle is normal  size.  No significant valvular abnormalities.  IVC diameter <2.1 cm collapsing >50% with sniff suggests a normal RA pressure  of 3 mmHg.  There is no prior study for direct comparison.    ZIO 3/28 - 4/11/2023:  Findings: Patient's monitor was in place for 13 days and 15 hours.  Minimum heart rate 38 bpm, average heart rate 65 bpm, maximum heart rate 167 bpm. Rhythm/s present include sinus rhythm, supraventricular tachycardia. There were rare ventricular ectopic beats accounting for <1% of all beats. There were rare ventricular triplets and rare couplets.  Ventricular bigeminy and trigeminy were present.  There were rare supraventricular ectopic beats.  There were 2 triggered events and 0 diary entries during which time the noted rhythms were sinus.  There was 1 episode of supraventricular tachycardia that may possibly have been atrial tachycardia with variable block.  Review of actual tracings showed no other significant abnormality.  Impression: Zio patch monitor showing predominantly sinus rhythm.    Fern Anderson DO      PAST MEDICAL HISTORY:   Past Medical History:   Diagnosis Date     Closed " nondisplaced fracture of medial condyle of right tibia 07/08/2022     Low back pain     No Comments Provided          FAMILY HISTORY:   Family History   Problem Relation Age of Onset     Arthritis Father 33        Arthritis,RA     Rheumatoid Arthritis Father      Arthritis Brother         Arthritis,RA     Heart Disease Maternal Grandfather      Family History Negative Daughter         Good Health,born 1996     Family History Negative Daughter         Good Health,born 1998     Family History Negative Son         Good Health,born 2001          PAST SURGICAL HISTORY:   Past Surgical History:   Procedure Laterality Date     ADENOIDECTOMY      No Comments Provided     COLONOSCOPY  04/10/2009    normal, next due age 48     COLONOSCOPY  11/14/2022    F/U 2032 normal     COLONOSCOPY N/A 11/14/2022    normal colonoscopy, follow up 11/14/32     TYMPANOSTOMY, LOCAL/TOPICAL ANESTHESIA      PE tube placement          SOCIAL HISTORY:   Social History     Socioeconomic History     Marital status:      Spouse name: Prabha     Number of children: 4     Years of education: 12+     Highest education level: High school graduate   Occupational History     Employer: [employer]   Tobacco Use     Smoking status: Never     Smokeless tobacco: Never   Vaping Use     Vaping status: Never Used   Substance and Sexual Activity     Alcohol use: Not Currently     Comment: socially     Drug use: Never     Sexual activity: Yes     Partners: Female   Social History Narrative    .  Had DOT physical done 07/14/09.      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     Wife works at [employer].    Children:  Melvin (dental school 2022); Skye (medschool 2022); Rosas (D 2022)          CURRENT MEDICATIONS:   Prior to Admission medications    Not on File          ALLERGIES:   Allergies   Allergen Reactions     Other Food Allergy Itching and GI Disturbance     Almonds           ROS:  "  CONSTITUTIONAL: No reported fever or chills. No changes in weight.  ENT: No visual disturbance, ear ache, epistaxis or sore throat.   CARDIOVASCULAR: No chest pain, chest pressure or chest discomfort. Positive for palpitations, irregular heart beat- see HPI. No lower extremity edema.   RESPIRATORY: No shortness of breath, dyspnea upon exertion, cough, wheezing or hemoptysis.  No reported orthopnea or PND.  GI: No reported abdominal pain, melena or hematochezia.    : No reported hematuria or dysuria.   NEUROLOGICAL: No headache, lightheadedness, dizziness, syncope, ataxia, paresthesias or weakness.   HEMATOLOGIC: No history of anemia. No bleeding or excessive bruising. No history of blood clots.   MUSCULOSKELETAL: No new joint pain or swelling, no muscle pain.   ENDOCRINOLOGIC: No temperature intolerance. No hair or skin changes.  SKIN: No abnormal rashes or sores, no unusual itching.      PHYSICAL EXAM:   /80 (BP Location: Right arm, Patient Position: Sitting, Cuff Size: Adult Regular)   Pulse 66   Temp 98.1  F (36.7  C) (Tympanic)   Resp 14   Ht 1.715 m (5' 7.5\")   Wt 83.6 kg (184 lb 3.2 oz)   SpO2 97%   BMI 28.42 kg/m    GENERAL: The patient is a well-developed, well-nourished, in no apparent distress.  HEENT: Head is normocephalic and atraumatic. Eyes are symmetrical with normal visual tracking. No icterus, no xanthelasmas. Nares appeared normal without nasal drainage. Mucous membranes are moist, no cyanosis.  NECK: Supple, JVP not visible.   CHEST/ LUNGS: Lungs clear to auscultation, no rales, rhonchi or wheezes, no use of accessory muscles, no retractions, respirations unlabored and normal respiratory rate.   CARDIO: Regular rate and rhythm normal with S1 and S2, no S3 or S4 and no murmur, click or rub.   ABD: Abdomen is nondistended.  EXTREMITIES: No edema.  MUSCULOSKELETAL: No visible joint swelling.   NEUROLOGIC: Alert and oriented X3. Normal speech, gait and affect. No focal neurologic " deficits.   SKIN: No jaundice. No rashes or visible skin lesions present. No ecchymosis.       LAB RESULTS:   Office Visit on 04/26/2023   Component Date Value Ref Range Status     Ventricular Rate 04/26/2023 47  BPM Final     Atrial Rate 04/26/2023 47  BPM Final     MA Interval 04/26/2023 160  ms Final     QRS Duration 04/26/2023 82  ms Final     QT 04/26/2023 458  ms Final     QTc 04/26/2023 405  ms Final     P Axis 04/26/2023 17  degrees Final     R AXIS 04/26/2023 40  degrees Final     T Axis 04/26/2023 36  degrees Final     Interpretation ECG 04/26/2023    Final                    Value:Sinus bradycardia  Septal infarct (cited on or before 14-NOV-2022)  When compared with ECG of 14-NOV-2022 10:03,  Premature ventricular complexes are no longer Present  Confirmed by MD MULLEN DANIEL ([id number]) on 5/1/2023 5:21:09 PM       CRP Inflammation 04/26/2023 <3.00  <5.00 mg/L Final     Erythrocyte Sedimentation Rate 04/26/2023 5  0 - 15 mm/hr Final     Troponin T, High Sensitivity 04/26/2023 <6  <=22 ng/L Final    Either a High Sensitivity Troponin T baseline (0 hours) value = 100 ng/L, or an increase in High Sensitivity Troponin T = 7 ng/L at 2 hours compared to 0 hours (2-0 hours), suggests myocardial injury, and urgent clinical attention is required.    If the 2-0 hours increase is <7 ng/L, a High Sensitivity Troponin T result above gender-specific reference ranges warrants further evaluation.   Recommendations for further evaluation include correlation with clinical decision-making tool (e.g., HEART), a 3rd High Sensitivity Troponin T test 2 hours after the 2nd (a 20% change from baseline would represent concern), admission for observation, close PCC/cardiology follow-up, or urgent outpatient provocative testing.         ASSESSMENT:   Sonny Dubois presents for cardiology follow-up to visit on 4/26/2023. He was initially evaluated by cardiology with irregular heart rhythm.  Patient with limited past medical  "history which includes Hashimoto's thyroiditis, fracture of the left tibia and lumbosacral radiculitis. Family history notable for ischemic heart disease, maternal grandfather with history of MI, paternal cousins with ischemic heart disease noted in their 50s. Sister with history of sarcoidosis, not identified to be intracardiac.  Today, patient reports short time that symptoms did improve. Over last week he has had recurrence of palpitations with rate variability. Describes \"not feeling right\". No increased dyspnea, no new chest pain or pressure. No lightheadedness or syncope.     1. Abnormal ECG during exercise stress test  2. Tachycardia-bradycardia (H)  3. Ventricular bigeminy  4. Fatigue, unspecified type  5. Infection due to 2019 novel coronavirus  6. Hashimoto's thyroiditis      PLAN:   1. Patient with palpitations, rate variability dating back to this past November 2023.  Inappropriate tachycardic response to physical activity, this is a change from his baseline. S/p COVID, there was concern for possible COVID related pericarditis. He is largely asymptomatic other than palpitations.  2.  Reviewed results from recent stress echocardiogram, imaging without evidence of stress-induced ischemia, preserved LV systolic function.  ECG portion of this study was abnormal.  At rest he demonstrated sinus bradycardia with occasional PVCs.  At peak exercise the patient developed borderline upsloping ST depression in leads II, V3 through V6, these changes resolved with rest.  Given the abnormal stress ECG and continued report of palpitations and generally feeling not right, he will proceed with further imaging- coronary CT angiogram.  3. TTE previously reviewed from 3/28/2023-normal biventricular function, LVEF 55 to 60%, no LV RWMA's, no hemodynamically significant valvular disease and no pericardial effusion.  4.  Previously reviewed ZIO revealing predominantly sinus rhythm with rates averaging 65 bpm the heart rate " ranged from 38 to 146 bpm in sinus rhythm.  1 episode of SVT lasting 10.5 seconds for which the patient was not symptomatic.  No symptomatic bradycardia, pauses, second-degree Mobitz type II or third-degree AV block.  SVE and VE were present as isolated beats, couplets and triplets-less than 1% burden.  Ventricular bigeminy and ventricular trigeminy were present on this monitor.  5.  Labs from previous visit reviewed and stable.  We will reassess his thyroid function today.  6. Consider 30 day event monitor if continued palpitations and rate variability. EP to review following.     Thank you for allowing me to participate in the care of your patient. Please do not hesitate to contact me if you have any questions.     Faby Hogan, APRN CNP [facility]

## 2023-05-24 NOTE — PATIENT INSTRUCTIONS
You will receive a phone call to schedule CT angiogram of your coronary arteries.  If the above testing is normal, plan to proceed with cardiac event monitor (30 day). Once monitor results received, telemed visit with EP to review.

## 2023-05-24 NOTE — NURSING NOTE
"Chief Complaint   Patient presents with     Follow Up     Stress test results        Initial /80 (BP Location: Right arm, Patient Position: Sitting, Cuff Size: Adult Regular)   Pulse 66   Temp 98.1  F (36.7  C) (Tympanic)   Resp 14   Ht 1.715 m (5' 7.5\")   Wt 83.6 kg (184 lb 3.2 oz)   SpO2 97%   BMI 28.42 kg/m   Estimated body mass index is 28.42 kg/m  as calculated from the following:    Height as of this encounter: 1.715 m (5' 7.5\").    Weight as of this encounter: 83.6 kg (184 lb 3.2 oz).  Meds Reconciled: unable or not appropriate to perform  Pt is not on Aspirin  Pt is not on a Statin  PHQ and/or ROGE reviewed. Pt referred to PCP/MH Provider as appropriate.    Nadine Crews LPN      "

## 2023-06-16 ENCOUNTER — TELEPHONE (OUTPATIENT)
Dept: CARDIOLOGY | Facility: OTHER | Age: 48
End: 2023-06-16
Payer: COMMERCIAL

## 2023-06-20 ENCOUNTER — HOSPITAL ENCOUNTER (OUTPATIENT)
Dept: CT IMAGING | Facility: OTHER | Age: 48
Discharge: HOME | End: 2023-06-20
Attending: NURSE PRACTITIONER | Admitting: NURSE PRACTITIONER
Payer: COMMERCIAL

## 2023-06-20 VITALS
HEART RATE: 52 BPM | OXYGEN SATURATION: 97 % | SYSTOLIC BLOOD PRESSURE: 118 MMHG | RESPIRATION RATE: 22 BRPM | DIASTOLIC BLOOD PRESSURE: 81 MMHG

## 2023-06-20 DIAGNOSIS — I49.5 TACHYCARDIA-BRADYCARDIA (H): ICD-10-CM

## 2023-06-20 DIAGNOSIS — R53.83 FATIGUE, UNSPECIFIED TYPE: ICD-10-CM

## 2023-06-20 DIAGNOSIS — I49.8 VENTRICULAR BIGEMINY: ICD-10-CM

## 2023-06-20 DIAGNOSIS — R94.31 ABNORMAL ECG DURING EXERCISE STRESS TEST: ICD-10-CM

## 2023-06-20 PROCEDURE — 250N000011 HC RX IP 250 OP 636: Performed by: NURSE PRACTITIONER

## 2023-06-20 PROCEDURE — 250N000013 HC RX MED GY IP 250 OP 250 PS 637: Performed by: STUDENT IN AN ORGANIZED HEALTH CARE EDUCATION/TRAINING PROGRAM

## 2023-06-20 PROCEDURE — 75574 CT ANGIO HRT W/3D IMAGE: CPT

## 2023-06-20 RX ORDER — DIPHENHYDRAMINE HCL 25 MG
25 CAPSULE ORAL
Status: DISCONTINUED | OUTPATIENT
Start: 2023-06-20 | End: 2023-06-21 | Stop reason: HOSPADM

## 2023-06-20 RX ORDER — METOPROLOL TARTRATE 25 MG/1
25-100 TABLET, FILM COATED ORAL
Status: DISCONTINUED | OUTPATIENT
Start: 2023-06-20 | End: 2023-06-21 | Stop reason: HOSPADM

## 2023-06-20 RX ORDER — DIPHENHYDRAMINE HYDROCHLORIDE 50 MG/ML
25-50 INJECTION INTRAMUSCULAR; INTRAVENOUS
Status: DISCONTINUED | OUTPATIENT
Start: 2023-06-20 | End: 2023-06-21 | Stop reason: HOSPADM

## 2023-06-20 RX ORDER — IOPAMIDOL 755 MG/ML
101 INJECTION, SOLUTION INTRAVASCULAR ONCE
Status: COMPLETED | OUTPATIENT
Start: 2023-06-20 | End: 2023-06-20

## 2023-06-20 RX ORDER — METOPROLOL TARTRATE 1 MG/ML
5-15 INJECTION, SOLUTION INTRAVENOUS
Status: DISCONTINUED | OUTPATIENT
Start: 2023-06-20 | End: 2023-06-21 | Stop reason: HOSPADM

## 2023-06-20 RX ORDER — METHYLPREDNISOLONE SODIUM SUCCINATE 125 MG/2ML
125 INJECTION, POWDER, LYOPHILIZED, FOR SOLUTION INTRAMUSCULAR; INTRAVENOUS
Status: DISCONTINUED | OUTPATIENT
Start: 2023-06-20 | End: 2023-06-21 | Stop reason: HOSPADM

## 2023-06-20 RX ORDER — NITROGLYCERIN 0.4 MG/1
0.4 TABLET SUBLINGUAL
Status: DISCONTINUED | OUTPATIENT
Start: 2023-06-20 | End: 2023-06-21 | Stop reason: HOSPADM

## 2023-06-20 RX ADMIN — NITROGLYCERIN 0.4 MG: 0.4 TABLET SUBLINGUAL at 12:18

## 2023-06-20 RX ADMIN — IOPAMIDOL 101 ML: 755 INJECTION, SOLUTION INTRAVENOUS at 12:18

## 2023-06-22 ENCOUNTER — MYC MEDICAL ADVICE (OUTPATIENT)
Dept: CARDIOLOGY | Facility: OTHER | Age: 48
End: 2023-06-22
Payer: COMMERCIAL

## 2023-06-22 DIAGNOSIS — I49.9 IRREGULAR HEART BEAT: Primary | ICD-10-CM

## 2023-06-22 DIAGNOSIS — R94.31 ABNORMAL ECG DURING EXERCISE STRESS TEST: ICD-10-CM

## 2023-06-22 DIAGNOSIS — I49.5 TACHYCARDIA-BRADYCARDIA (H): ICD-10-CM

## 2023-06-22 DIAGNOSIS — R53.83 FATIGUE, UNSPECIFIED TYPE: ICD-10-CM

## 2023-06-26 NOTE — TELEPHONE ENCOUNTER
Please call and notify patient that I have placed order for 30-day event monitor.  We will then schedule him for follow-up with EP to review the results. Please set him up for consult with Dr. Marie in 6-8 weeks to go over results of monitor.   Thanks,   OG Rooney CNP

## 2023-06-26 NOTE — TELEPHONE ENCOUNTER
"From 5/24/2023 office visit: \"Consider 30 day event monitor if continued palpitations and rate variability. EP to review following.\"     Please review and advise patient's Mychart question. Thank you.    Nadine Crews LPN on 6/26/2023 at 8:40 AM    "

## 2023-06-27 NOTE — TELEPHONE ENCOUNTER
Patient returned call. Spoke to patient after verifying last name and date of birth. Patient was informed of OG Fang CNP recommendation below. He verbalized understanding and asked that the recommendations be sent to him via [employer]. Patient informed that once he gets his event monitor placed to call and we can get the consult with Dr. Marie setup.     Jesusita Das RN on 6/27/2023 at 3:40 PM

## 2023-07-10 ENCOUNTER — HOSPITAL ENCOUNTER (OUTPATIENT)
Dept: CARDIOLOGY | Facility: OTHER | Age: 48
Discharge: HOME | End: 2023-07-10
Attending: NURSE PRACTITIONER
Payer: COMMERCIAL

## 2023-07-10 DIAGNOSIS — I49.5 TACHYCARDIA-BRADYCARDIA (H): ICD-10-CM

## 2023-07-10 DIAGNOSIS — R53.83 FATIGUE, UNSPECIFIED TYPE: ICD-10-CM

## 2023-07-10 DIAGNOSIS — I49.9 IRREGULAR HEART BEAT: ICD-10-CM

## 2023-07-10 DIAGNOSIS — R94.31 ABNORMAL ECG DURING EXERCISE STRESS TEST: ICD-10-CM

## 2023-07-10 PROCEDURE — 999N000096 CARDIAC MOBILE TELEMETRY MONITOR

## 2023-07-10 PROCEDURE — 93228 REMOTE 30 DAY ECG REV/REPORT: CPT | Performed by: INTERNAL MEDICINE

## 2023-07-10 PROCEDURE — 999N000157 HC STATISTIC RCP TIME EA 10 MIN

## 2023-07-10 NOTE — PATIENT INSTRUCTIONS
Date Placed on Pt:  07/10/2023    Patient was given MCOT device and instructed on:    -Keeping monitor and sensor within 30 feet of each other at all times  -How to record a sympton manually  -To remove patch at 5-7 days to charge sensor and reapply patch.  -How to remove sensor from original patch and to place in new patch.  -How to charge sensor and monitor  -How to move through the screens on the monitor  -When to trigger a aleksadner event  -How to return devices (FedEx/UPS)  -How long to wear device  -Who to call with questions  -Not to swim or take a bath with device on  -Patient instructed to wear for 30 days

## 2023-08-14 DIAGNOSIS — I47.29 PAROXYSMAL VENTRICULAR TACHYCARDIA (H): ICD-10-CM

## 2023-08-14 DIAGNOSIS — E06.3 HASHIMOTO'S THYROIDITIS: Primary | ICD-10-CM

## 2023-08-15 ENCOUNTER — MYC MEDICAL ADVICE (OUTPATIENT)
Dept: FAMILY MEDICINE | Facility: OTHER | Age: 48
End: 2023-08-15
Payer: COMMERCIAL

## 2023-08-15 ENCOUNTER — E-VISIT (OUTPATIENT)
Dept: FAMILY MEDICINE | Facility: OTHER | Age: 48
End: 2023-08-15
Payer: COMMERCIAL
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
## 2023-08-15 PROCEDURE — 99421 OL DIG E/M SVC 5-10 MIN: CPT | Performed by: FAMILY MEDICINE
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
## 2023-08-16 NOTE — PATIENT INSTRUCTIONS
Thank you for choosing us for your care. I have placed an order for a prescription so that you can start treatment. View your full visit summary for details by clicking on the link below. Your pharmacist will able to address any questions you may have about the medication.     If you're not feeling better within 5-7 days, please schedule an appointment.  You can schedule an appointment right here in [facility], or call [phone]  If the visit is for the same symptoms as your eVisit, we'll refund the cost of your eVisit if seen within seven days.

## 2023-08-28 ENCOUNTER — VIRTUAL VISIT (OUTPATIENT)
Dept: CARDIOLOGY | Facility: CLINIC | Age: 48
End: 2023-08-28
Attending: INTERNAL MEDICINE
Payer: COMMERCIAL

## 2023-08-28 ENCOUNTER — VIRTUAL VISIT (OUTPATIENT)
Dept: CARDIOLOGY | Facility: OTHER | Age: 48
End: 2023-08-28
Attending: INTERNAL MEDICINE
Payer: COMMERCIAL

## 2023-08-28 VITALS
HEART RATE: 52 BPM | DIASTOLIC BLOOD PRESSURE: 84 MMHG | WEIGHT: 185.2 LBS | TEMPERATURE: 97.4 F | RESPIRATION RATE: 12 BRPM | OXYGEN SATURATION: 99 % | BODY MASS INDEX: 28.58 KG/M2 | SYSTOLIC BLOOD PRESSURE: 122 MMHG

## 2023-08-28 VITALS
SYSTOLIC BLOOD PRESSURE: 122 MMHG | OXYGEN SATURATION: 99 % | WEIGHT: 185.2 LBS | RESPIRATION RATE: 12 BRPM | HEART RATE: 52 BPM | BODY MASS INDEX: 28.58 KG/M2 | DIASTOLIC BLOOD PRESSURE: 84 MMHG | TEMPERATURE: 97.4 F

## 2023-08-28 DIAGNOSIS — R94.31 ABNORMAL ECG DURING EXERCISE STRESS TEST: ICD-10-CM

## 2023-08-28 DIAGNOSIS — I49.5 TACHYCARDIA-BRADYCARDIA (H): Primary | ICD-10-CM

## 2023-08-28 DIAGNOSIS — I49.3 PVC'S (PREMATURE VENTRICULAR CONTRACTIONS): Primary | ICD-10-CM

## 2023-08-28 DIAGNOSIS — I47.10 PAROXYSMAL SUPRAVENTRICULAR TACHYCARDIA (H): ICD-10-CM

## 2023-08-28 DIAGNOSIS — E06.3 HASHIMOTO'S THYROIDITIS: ICD-10-CM

## 2023-08-28 DIAGNOSIS — I47.29 PAROXYSMAL VENTRICULAR TACHYCARDIA (H): ICD-10-CM

## 2023-08-28 LAB — TSH SERPL DL<=0.005 MIU/L-ACNC: 5.43 UIU/ML (ref 0.3–4.2)

## 2023-08-28 PROCEDURE — 99417 PROLNG OP E/M EACH 15 MIN: CPT | Mod: VID | Performed by: INTERNAL MEDICINE

## 2023-08-28 PROCEDURE — 36415 COLL VENOUS BLD VENIPUNCTURE: CPT | Mod: ZL

## 2023-08-28 PROCEDURE — 99215 OFFICE O/P EST HI 40 MIN: CPT | Mod: VID | Performed by: INTERNAL MEDICINE

## 2023-08-28 PROCEDURE — 84443 ASSAY THYROID STIM HORMONE: CPT | Mod: ZL

## 2023-08-28 RX ORDER — LEVOTHYROXINE SODIUM 50 UG/1
50 TABLET ORAL DAILY
Qty: 90 TABLET | Refills: 3 | Status: SHIPPED | OUTPATIENT
Start: 2023-08-28 | End: 2023-12-06

## 2023-08-28 RX ORDER — DILTIAZEM HYDROCHLORIDE 30 MG/1
30 TABLET, FILM COATED ORAL 4 TIMES DAILY PRN
Qty: 120 TABLET | Refills: 3 | Status: SHIPPED | OUTPATIENT
Start: 2023-08-28

## 2023-08-28 ASSESSMENT — PAIN SCALES - GENERAL: PAINLEVEL: MILD PAIN (2)

## 2023-08-28 NOTE — NURSING NOTE
"Denies shortness of breath, lightheadedness, nausea, increased fatigue, syncope or pre-syncope.  Patient has had chest discomfort and a fluttering feeling.  The fluttering feeling will wake him up at night.  Patient does get headaches at random times as well.    Lung sounds clear bilaterally.    Chief Complaint   Patient presents with    Telemedicine consult     Palpitations and rate variability       Initial /84 (BP Location: Right arm, Patient Position: Sitting, Cuff Size: Adult Regular)   Pulse 52   Temp 97.4  F (36.3  C) (Temporal)   Resp 12   Wt 84 kg (185 lb 3.2 oz)   SpO2 99%   BMI 28.58 kg/m   Estimated body mass index is 28.58 kg/m  as calculated from the following:    Height as of 5/24/23: 1.715 m (5' 7.5\").    Weight as of this encounter: 84 kg (185 lb 3.2 oz).  Medication Reconciliation: complete    Desiree Hope RN   "

## 2023-08-28 NOTE — PATIENT INSTRUCTIONS
You were seen in the Electrophysiology Clinic today by: Dr Marie    Plan:   Medication Changes:   START- Diltiazem 30mg as needed for palpitations, can take 1 tablet up to 4 times per day    Labs/Tests Needed:  Cardiac MRI     Follow up Visit:  To be determined based on results        If you have further questions, please utilize [employer] to contact us.     Your Care Team:    EP Cardiology   Telephone Number     Nurse Line  Alisha Mark, RN   Rajani Griffin, RN  Clint Wang RN   [phone]     For scheduling appointments:   Fer   [phone]   For procedure scheduling:    Noelle Slade     [phone]   For the Device Clinic (Pacemakers, ICDs, Loop Recorders)    During business hours: [phone]  After business hours:   [phone]- select option 4 and ask for job code [id number].       On-call cardiologist for after hours or on weekends:   [phone], option #4, and ask to speak to the on-call cardiologist.     Cardiovascular Clinic:   [address] [place] [address]. [place] [address]      As always, Thank you for trusting us with your health care needs!

## 2023-08-28 NOTE — PROGRESS NOTES
ELECTROPHYSIOLOGY TELEMEDICINE CLINIC VISIT    Assessment/Recommendations   Assessment/Plan:    Mr. Dubois is a 48 year old male who tatum a medical history significant for PVCs, Hashimoto's thyroiditis, left tibia fracture.      Premature Ventricular Contractions:   We had a long discussion with patient regarding PVCs. We discussed various options for treatment of PVCs. In a structurally normal heart, PVCs are considered relatively benign. Treatment is therefore indicated primarily for relief of symptoms or if associated with a cardiomyopathy. Medications such as calcium channel blockers or beta blockers in some cases reduce the frequency and duration of the episodes. The other option discussed was an electrophysiology study (EPS) and possible ablation. The procedural risk of EPS and PVC ablation were discussed in detail. Risks discussed include: vascular complications, CVA, AVB, pericardial effusion and tamponade. We also discussed that if PVC burden is somewhat variable, it is possible that there would be sufficient amount of PVCs during procedure to allow for adequate mapping. We also discussed that at times the PVCs will be traced to an origin in the heart where it is not safe to proceed with ablation. After our discussion, he would like to do more conservative management and we agreed to do diltiazem 30 mg daily PRN palpitations. We will also get a CMR to evaluate for any nidus for PVCS.     Follow up to be determined based on results.        History of Present Illness/Subjective    Mr. Sonny Dubois is a 48 year old male who comes in today for EP consultation of palpitations/PVCs.    Mr. Dubois is a 48 year old male who tatum a medical history significant for PVCs, Hashimoto's thyroiditis, left tibia fracture.    He had a colonoscopy in 11/2022 and was reported to have heart rate variability christine and tachy episodes while on monitor. An ECG showed SB with PVCs. A week after his colonoscopy he  "also was diagnosed with COVID. He reported having palpitations with associated fullness in his throat. A zio patch monitor showed 1 PSVT 10 seconds and rare isolated ectopy. He more recently followed with Ms. Gutierrez and reported he is having recurrence of palpitations and not \"feeling right\". A resting echo from 3/2023 showed normal cardiac structure and function, A stress echo from 5/2023 showed normal structure and function and negative for ischemia. An ambulatory cardiac monitor from 7/10/23-8/8/23 showed 3 NSVT up to 6 beats and 4% PVC burden with 1 symptom activation. TSH mildly elevated, renal function, electrolytes and Hgb stable.     He was referred to EP for evaluation and management of palpitations/PVCs. He reports feeling at baseline. He denies chest discomfort,  abdominal fullness/bloating or peripheral edema, shortness of breath, paroxysmal nocturnal dyspnea, orthopnea, lightheadedness, dizziness, pre-syncope, or syncope. No current cardiac medications.       I have reviewed and updated the patient's Past Medical History, Social History, Family History and Medication List.     Cardiographics (Personally Reviewed) :   5/22/23 Stress Echo:   Interpretation Summary   This was a normal stress echocardiogram with no evidence of stress-induced   ischemia. Normal resting LV function with EF of approximately 60-65%; normal   response to exercise with increase to approximately 70-75%. No stress induced   regional wall motion abnormalities. No significant valvular disease noted on   routine screening color flow Doppler and pulsed Doppler examination. No   resting wallmotion abnormalities.   Target Heart Rate was achieved.   Exercise was stopped due to fatigue.   The patient did not exhibit any symptoms during exercise.   Stress test interpreted separately.     3/28/23 Echo:  Interpretation Summary  Global and regional left ventricular function is normal with an EF of 55-60%.  Global right ventricular function is " normal. The right ventricle is normal  size.  No significant valvular abnormalities.  IVC diameter <2.1 cm collapsing >50% with sniff suggests a normal RA pressure  of 3 mmHg.  There is no prior study for direct comparison.    6/20/23 CT Coronary Angiogram:  IMPRESSION:       Total calcium score of 0.      The coronary arteries are widely patent.         Physical Examination   /84 (BP Location: Right arm, Patient Position: Sitting, Cuff Size: Adult Regular)   Pulse 52   Temp 97.4  F (36.3  C) (Temporal)   Resp 12   Wt 84 kg (185 lb 3.2 oz)   SpO2 99%   BMI 28.58 kg/m    Wt Readings from Last 3 Encounters:   05/24/23 83.6 kg (184 lb 3.2 oz)   04/26/23 84.8 kg (187 lb)   03/17/23 86 kg (189 lb 9.6 oz)       CONSITUTIONAL: no acute distress  HEENT: no icterus, no redness or discharge, neck supple  CV: no visible edema of visualized extremities. No JVD.   RESPIRATORY: respirations nonlabored, no cough  NEURO: AA&Ox3, speech fluent/appropriate, motor grossly nonfocal  PSYCH: cooperative, affect appropriate  DERM: no rashes on visualized face/neck/upper extremities         Medications  Allergies   Current Outpatient Medications   Medication Sig Dispense Refill     levothyroxine (SYNTHROID/LEVOTHROID) 25 MCG tablet Take 1 tablet (25 mcg) by mouth daily 90 tablet 3    Allergies   Allergen Reactions     Other Food Allergy Itching and GI Disturbance     Almonds          Lab Results (Personally Reviewed)    Chemistry/lipid CBC Cardiac Enzymes/BNP/TSH/INR   Lab Results   Component Value Date    BUN 13.2 03/17/2023     03/17/2023    CO2 28 03/17/2023     Creatinine   Date Value Ref Range Status   03/17/2023 1.08 0.67 - 1.17 mg/dL Final       Lab Results   Component Value Date    CHOL 167 07/12/2022    HDL 49 07/12/2022     (H) 07/12/2022      Lab Results   Component Value Date    WBC 4.8 03/17/2023    HGB 14.5 03/17/2023    HCT 43.1 03/17/2023    MCV 93 03/17/2023     03/17/2023    Lab Results    Component Value Date    TSH 7.39 (H) 05/24/2023        The patient states understanding and is agreeable with the plan.   Aldo Marie MD [facility]  Cardiology - Electrophysiology        Total time spent on patient visit, reviewing notes, imaging, labs, orders, and completing necessary documentation: 60 minutes.

## 2023-08-28 NOTE — NURSING NOTE
Aldo Marie MD would like patient to start PRN diltiazem and to have a cardiac MRI.  Desiree Hope RN......August 28, 2023...12:18 PM

## 2023-08-28 NOTE — LETTER
8/28/2023      RE: Sonny Dubois  [id number] [facility] [address]       Dear Colleague,    Thank you for the opportunity to participate in the care of your patient, Sonny Dubois, at the [facility] [place] [facility]. Please see a copy of my visit note below.        ELECTROPHYSIOLOGY TELEMEDICINE CLINIC VISIT    Assessment/Recommendations   Assessment/Plan:    Mr. Dubois is a 48 year old male who tatum a medical history significant for PVCs, Hashimoto's thyroiditis, left tibia fracture.      Premature Ventricular Contractions:   We had a long discussion with patient regarding PVCs. We discussed various options for treatment of PVCs. In a structurally normal heart, PVCs are considered relatively benign. Treatment is therefore indicated primarily for relief of symptoms or if associated with a cardiomyopathy. Medications such as calcium channel blockers or beta blockers in some cases reduce the frequency and duration of the episodes. The other option discussed was an electrophysiology study (EPS) and possible ablation. The procedural risk of EPS and PVC ablation were discussed in detail. Risks discussed include: vascular complications, CVA, AVB, pericardial effusion and tamponade. We also discussed that if PVC burden is somewhat variable, it is possible that there would be sufficient amount of PVCs during procedure to allow for adequate mapping. We also discussed that at times the PVCs will be traced to an origin in the heart where it is not safe to proceed with ablation. After our discussion, he would like to do more conservative management and we agreed to do diltiazem 30 mg daily PRN palpitations. We will also get a CMR to evaluate for any nidus for PVCS.     Follow up to be determined based on results.        History of Present Illness/Subjective    Mr. Sonny Dubois is a 48 year old male who comes in today for EP  "consultation of palpitations/PVCs.    Mr. Dubois is a 48 year old male who tatum a medical history significant for PVCs, Hashimoto's thyroiditis, left tibia fracture.    He had a colonoscopy in 11/2022 and was reported to have heart rate variability christine and tachy episodes while on monitor. An ECG showed SB with PVCs. A week after his colonoscopy he also was diagnosed with COVID. He reported having palpitations with associated fullness in his throat. A zio patch monitor showed 1 PSVT 10 seconds and rare isolated ectopy. He more recently followed with Ms. Gutierrez and reported he is having recurrence of palpitations and not \"feeling right\". A resting echo from 3/2023 showed normal cardiac structure and function, A stress echo from 5/2023 showed normal structure and function and negative for ischemia. An ambulatory cardiac monitor from 7/10/23-8/8/23 showed 3 NSVT up to 6 beats and 4% PVC burden with 1 symptom activation. TSH mildly elevated, renal function, electrolytes and Hgb stable.     He was referred to EP for evaluation and management of palpitations/PVCs. He reports feeling at baseline. He denies chest discomfort,  abdominal fullness/bloating or peripheral edema, shortness of breath, paroxysmal nocturnal dyspnea, orthopnea, lightheadedness, dizziness, pre-syncope, or syncope. No current cardiac medications.       I have reviewed and updated the patient's Past Medical History, Social History, Family History and Medication List.     Cardiographics (Personally Reviewed) :   5/22/23 Stress Echo:   Interpretation Summary   This was a normal stress echocardiogram with no evidence of stress-induced   ischemia. Normal resting LV function with EF of approximately 60-65%; normal   response to exercise with increase to approximately 70-75%. No stress induced   regional wall motion abnormalities. No significant valvular disease noted on   routine screening color flow Doppler and pulsed Doppler examination. No   resting " wallmotion abnormalities.   Target Heart Rate was achieved.   Exercise was stopped due to fatigue.   The patient did not exhibit any symptoms during exercise.   Stress test interpreted separately.     3/28/23 Echo:  Interpretation Summary  Global and regional left ventricular function is normal with an EF of 55-60%.  Global right ventricular function is normal. The right ventricle is normal  size.  No significant valvular abnormalities.  IVC diameter <2.1 cm collapsing >50% with sniff suggests a normal RA pressure  of 3 mmHg.  There is no prior study for direct comparison.    6/20/23 CT Coronary Angiogram:  IMPRESSION:       Total calcium score of 0.      The coronary arteries are widely patent.         Physical Examination   /84 (BP Location: Right arm, Patient Position: Sitting, Cuff Size: Adult Regular)   Pulse 52   Temp 97.4  F (36.3  C) (Temporal)   Resp 12   Wt 84 kg (185 lb 3.2 oz)   SpO2 99%   BMI 28.58 kg/m    Wt Readings from Last 3 Encounters:   05/24/23 83.6 kg (184 lb 3.2 oz)   04/26/23 84.8 kg (187 lb)   03/17/23 86 kg (189 lb 9.6 oz)       CONSITUTIONAL: no acute distress  HEENT: no icterus, no redness or discharge, neck supple  CV: no visible edema of visualized extremities. No JVD.   RESPIRATORY: respirations nonlabored, no cough  NEURO: AA&Ox3, speech fluent/appropriate, motor grossly nonfocal  PSYCH: cooperative, affect appropriate  DERM: no rashes on visualized face/neck/upper extremities         Medications  Allergies   Current Outpatient Medications   Medication Sig Dispense Refill    levothyroxine (SYNTHROID/LEVOTHROID) 25 MCG tablet Take 1 tablet (25 mcg) by mouth daily 90 tablet 3    Allergies   Allergen Reactions    Other Food Allergy Itching and GI Disturbance     Almonds          Lab Results (Personally Reviewed)    Chemistry/lipid CBC Cardiac Enzymes/BNP/TSH/INR   Lab Results   Component Value Date    BUN 13.2 03/17/2023     03/17/2023    CO2 28 03/17/2023      Creatinine   Date Value Ref Range Status   03/17/2023 1.08 0.67 - 1.17 mg/dL Final       Lab Results   Component Value Date    CHOL 167 07/12/2022    HDL 49 07/12/2022     (H) 07/12/2022      Lab Results   Component Value Date    WBC 4.8 03/17/2023    HGB 14.5 03/17/2023    HCT 43.1 03/17/2023    MCV 93 03/17/2023     03/17/2023    Lab Results   Component Value Date    TSH 7.39 (H) 05/24/2023        The patient states understanding and is agreeable with the plan.   Aldo Marie MD [facility]  Cardiology - Electrophysiology        Total time spent on patient visit, reviewing notes, imaging, labs, orders, and completing necessary documentation: 60 minutes.                    Please do not hesitate to contact me if you have any questions/concerns.

## 2023-10-08 ENCOUNTER — HEALTH MAINTENANCE LETTER (OUTPATIENT)
Age: 48
End: 2023-10-08

## 2023-12-05 ENCOUNTER — APPOINTMENT (OUTPATIENT)
Dept: LAB | Facility: OTHER | Age: 48
End: 2023-12-05
Attending: FAMILY MEDICINE
Payer: COMMERCIAL

## 2023-12-05 LAB — TSH SERPL DL<=0.005 MIU/L-ACNC: 6.24 UIU/ML (ref 0.3–4.2)

## 2023-12-05 PROCEDURE — 36415 COLL VENOUS BLD VENIPUNCTURE: CPT | Mod: ZL,GT,95

## 2023-12-05 PROCEDURE — 84443 ASSAY THYROID STIM HORMONE: CPT | Mod: ZL,GT,95

## 2023-12-06 DIAGNOSIS — E06.3 HASHIMOTO'S THYROIDITIS: ICD-10-CM

## 2023-12-06 RX ORDER — LEVOTHYROXINE SODIUM 75 UG/1
75 TABLET ORAL DAILY
Qty: 90 TABLET | Refills: 3 | Status: SHIPPED | OUTPATIENT
Start: 2023-12-06

## 2023-12-12 ENCOUNTER — HOSPITAL ENCOUNTER (OUTPATIENT)
Dept: MRI IMAGING | Facility: CLINIC | Age: 48
Discharge: HOME | End: 2023-12-12
Attending: NURSE PRACTITIONER | Admitting: NURSE PRACTITIONER
Payer: COMMERCIAL

## 2023-12-12 DIAGNOSIS — I49.3 PVC'S (PREMATURE VENTRICULAR CONTRACTIONS): ICD-10-CM

## 2023-12-12 PROCEDURE — 75561 CARDIAC MRI FOR MORPH W/DYE: CPT | Mod: 26 | Performed by: STUDENT IN AN ORGANIZED HEALTH CARE EDUCATION/TRAINING PROGRAM

## 2023-12-12 PROCEDURE — 255N000002 HC RX 255 OP 636: Mod: JZ | Performed by: NURSE PRACTITIONER

## 2023-12-12 PROCEDURE — A9585 GADOBUTROL INJECTION: HCPCS | Mod: JZ | Performed by: NURSE PRACTITIONER

## 2023-12-12 PROCEDURE — 75561 CARDIAC MRI FOR MORPH W/DYE: CPT

## 2023-12-12 RX ORDER — GADOBUTROL 604.72 MG/ML
10 INJECTION INTRAVENOUS ONCE
Status: COMPLETED | OUTPATIENT
Start: 2023-12-12 | End: 2023-12-12

## 2023-12-12 RX ADMIN — GADOBUTROL 10 ML: 604.72 INJECTION INTRAVENOUS at 09:53

## 2023-12-21 DIAGNOSIS — I49.3 PVC'S (PREMATURE VENTRICULAR CONTRACTIONS): Primary | ICD-10-CM

## 2023-12-21 DIAGNOSIS — I47.29 PAROXYSMAL VENTRICULAR TACHYCARDIA (H): ICD-10-CM

## 2024-01-17 ENCOUNTER — MYC MEDICAL ADVICE (OUTPATIENT)
Dept: FAMILY MEDICINE | Facility: OTHER | Age: 49
End: 2024-01-17
Payer: COMMERCIAL

## 2024-01-17 DIAGNOSIS — I47.29 PAROXYSMAL VENTRICULAR TACHYCARDIA (H): ICD-10-CM

## 2024-01-17 DIAGNOSIS — I49.9 IRREGULAR CARDIAC RHYTHM: Primary | ICD-10-CM

## 2024-01-25 DIAGNOSIS — I49.3 VENTRICULAR PREMATURE BEATS: Primary | ICD-10-CM

## 2024-01-30 ENCOUNTER — TELEPHONE (OUTPATIENT)
Dept: CARDIOLOGY | Facility: CLINIC | Age: 49
End: 2024-01-30
Payer: COMMERCIAL

## 2024-01-30 NOTE — TELEPHONE ENCOUNTER
Patient Contacted for the patient to call back and schedule the following:    Appointment type: Zio Patch   Provider: josh cotton  Return date: 01/24/24  Specialty phone number: [phone] opt 1   Additional appointment(s) needed: n/a   Additonal Notes:  pt stated he will call back to schedule

## 2024-02-19 ENCOUNTER — LAB (OUTPATIENT)
Dept: LAB | Facility: OTHER | Age: 49
End: 2024-02-19
Attending: FAMILY MEDICINE
Payer: COMMERCIAL

## 2024-02-19 DIAGNOSIS — E06.3 HASHIMOTO'S THYROIDITIS: ICD-10-CM

## 2024-02-19 LAB — TSH SERPL DL<=0.005 MIU/L-ACNC: 1.87 UIU/ML (ref 0.3–4.2)

## 2024-02-19 PROCEDURE — 36415 COLL VENOUS BLD VENIPUNCTURE: CPT | Mod: ZL

## 2024-02-19 PROCEDURE — 84443 ASSAY THYROID STIM HORMONE: CPT | Mod: ZL

## 2024-04-04 ENCOUNTER — TELEPHONE (OUTPATIENT)
Dept: CARDIOLOGY | Facility: CLINIC | Age: 49
End: 2024-04-04
Payer: COMMERCIAL

## 2024-04-04 NOTE — TELEPHONE ENCOUNTER
Left Voicemail (1st Attempt) for the patient to call back and schedule the following:    Appointment type: return ep  Provider: josh cotton   Return date: 06/02/24  Specialty phone number: [phone] opt   Additional appointment(s) needed: n/a   Additonal Notes: n/a

## 2024-04-09 ENCOUNTER — TELEPHONE (OUTPATIENT)
Dept: CARDIOLOGY | Facility: CLINIC | Age: 49
End: 2024-04-09
Payer: COMMERCIAL

## 2024-05-30 ENCOUNTER — E-VISIT (OUTPATIENT)
Dept: FAMILY MEDICINE | Facility: OTHER | Age: 49
End: 2024-05-30
Payer: COMMERCIAL

## 2024-05-30 ENCOUNTER — MYC MEDICAL ADVICE (OUTPATIENT)
Dept: FAMILY MEDICINE | Facility: OTHER | Age: 49
End: 2024-05-30
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
## 2024-05-30 PROCEDURE — 99421 OL DIG E/M SVC 5-10 MIN: CPT | Performed by: FAMILY MEDICINE

## 2024-05-30 RX ORDER — DOXYCYCLINE HYCLATE 100 MG
200 TABLET ORAL ONCE
Qty: 2 TABLET | Refills: 0 | Status: SHIPPED | OUTPATIENT
Start: 2024-05-30 | End: 2024-05-30

## 2024-07-11 DIAGNOSIS — I49.3 VENTRICULAR PREMATURE BEATS: Primary | ICD-10-CM

## 2024-10-27 ENCOUNTER — MYC MEDICAL ADVICE (OUTPATIENT)
Dept: FAMILY MEDICINE | Facility: OTHER | Age: 49
End: 2024-10-27
Payer: COMMERCIAL

## 2024-10-27 DIAGNOSIS — R79.89 ELEVATED TSH: Primary | ICD-10-CM

## 2024-10-28 ENCOUNTER — THERAPY VISIT (OUTPATIENT)
Dept: CHIROPRACTIC MEDICINE | Facility: OTHER | Age: 49
End: 2024-10-28
Attending: CHIROPRACTOR
Payer: COMMERCIAL

## 2024-10-28 VITALS — TEMPERATURE: 97.3 F | OXYGEN SATURATION: 98 % | RESPIRATION RATE: 16 BRPM | HEART RATE: 67 BPM

## 2024-10-28 DIAGNOSIS — M54.6 PAIN IN THORACIC SPINE: ICD-10-CM

## 2024-10-28 DIAGNOSIS — M94.0 SLIPPED RIB SYNDROME: ICD-10-CM

## 2024-10-28 DIAGNOSIS — M99.02 SEGMENTAL AND SOMATIC DYSFUNCTION OF THORACIC REGION: Primary | ICD-10-CM

## 2024-10-28 PROCEDURE — 99202 OFFICE O/P NEW SF 15 MIN: CPT | Mod: 25 | Performed by: CHIROPRACTOR

## 2024-10-28 PROCEDURE — 98940 CHIROPRACT MANJ 1-2 REGIONS: CPT | Mod: AT | Performed by: CHIROPRACTOR

## 2024-10-28 NOTE — PROGRESS NOTES
Left upper back is constantly burning. 3/10 W24 3/10.   Princess Burrellpatti on 10/28/2024 at 3:25 PM    Reviewed by CC.    PATIENT:  Sonny Dubois is a 49 year old male presenting for left-sided upper back and rib pain.  Patient states that he was weightlifting about 6 weeks ago.  While holding the weight in a bench like position he repositioned his back and felt a sharper pain in his mid back near his shoulder blade on the left.  Patient states that he put the weight down and did not lift for couple weeks.  Symptoms have improved and he is no longer experiencing the sharp pain.  Initially right after the injury he also had pain with deep inspiration which is since gone away.  He has worked out and lifted weights since the incident without issue.  He does however experience a constant burning ache rated 3/10.  Patient states that when that bothers him he will feel a pain through his chest towards his sternum as well.  This started after the left upper back pain and continues to persist some.  Initially this had him concerned as he does have a history of cardiac issues after a intense bout of COVID in 2020.  Patient states that he does have left arm pain but it has been ongoing for a long time.  He meets with and is managed by a cardiologist.  Patient states that his left upper back pain does not prevent him from performing any tasks or any daily activity, but does report that because the dull ache is still ongoing he would like it taken care of.    Patient does report having a chiropractic experience a long time ago that did not go well due to heavy handedness with spinal manipulation.    PROBLEM:   Date of Initial Visit for this Episode:  10/28/2024    Visit #1    (DVPRS) Pain Rating Score : 3-Sometimes distracts me (W24 3/10) (10/28/24 1521)    Oswestry (TERRY) Questionnaire        10/28/2024     3:25 PM   OSWESTRY DISABILITY INDEX   Count 9   Sum 2   Oswestry Score (%) 4.44 %        KeeleSTART Back Sub score 0  Total score 2       PAST MEDICAL HISTORY:  Past Medical History:   Diagnosis Date    Closed nondisplaced fracture of medial condyle of right tibia 07/08/2022    Low back pain     No Comments Provided       PAST SURGICAL HISTORY:  Past Surgical History:   Procedure Laterality Date    ADENOIDECTOMY      No Comments Provided    COLONOSCOPY  04/10/2009    normal, next due age 48    COLONOSCOPY  11/14/2022    F/U 2032 normal    COLONOSCOPY N/A 11/14/2022    normal colonoscopy, follow up 11/14/32    TYMPANOSTOMY, LOCAL/TOPICAL ANESTHESIA      PE tube placement       ALLERGIES:  Allergies   Allergen Reactions    Other Food Allergy Itching and GI Disturbance     Almonds        CURRENT MEDICATIONS:  Current Outpatient Medications   Medication Sig Dispense Refill    diltiazem (CARDIZEM) 30 MG tablet Take 1 tablet (30 mg) by mouth 4 times daily as needed (palpitations, take at onset of symptoms and may repeat 30-60 minutes after dose for ongoing symptoms) 120 tablet 3    levothyroxine (SYNTHROID/LEVOTHROID) 75 MCG tablet Take 1 tablet (75 mcg) by mouth daily 90 tablet 3       SOCIAL HISTORY:  Social History     Socioeconomic History    Marital status:      Spouse name: Prabha    Number of children: 4    Years of education: 12+    Highest education level: High school graduate   Occupational History     Employer: [employer]   Tobacco Use    Smoking status: Never    Smokeless tobacco: Never   Vaping Use    Vaping status: Never Used   Substance and Sexual Activity    Alcohol use: Yes     Comment: socially    Drug use: Never    Sexual activity: Yes     Partners: Female   Social History Narrative    .  Had DOT physical done 07/14/09.      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     Wife works at [employer].    Children:  Melvin (dental school 2022); Skye (medschool 2022); Rosas (D 2022)     FAMILY HISTORY:  Family History   Problem Relation Age of Onset    Arthritis  Father 33        Arthritis,RA    Rheumatoid Arthritis Father     Arthritis Brother         Arthritis,RA    Heart Disease Maternal Grandfather     Family History Negative Daughter         Good Health,born 1996    Family History Negative Daughter         Good Health,born 1998    Family History Negative Son         Good Health,born 2001       Patient Active Problem List   Diagnosis    Disturbance of skin sensation    Thoracic or lumbosacral neuritis or radiculitis, unspecified    Closed fracture of distal end of left tibia, unspecified fracture morphology, initial encounter    Rectal bleeding    Hashimoto's thyroiditis       ROS:  The patient denies any fevers, chills, nausea, vomiting, diarrhea, constipation,dysuria, hematuria, or urinary hesitancy or incontinence.  No shortness of breath, chest pain, or rashes.    OBJECTIVE:      PHYSICAL EXAM:   Pulse 67   Temp 97.3  F (36.3  C) (Tympanic)   Resp 16   SpO2 98%    GENERAL APPEARANCE: healthy, alert, and no distress   GAIT: NORMAL  SKIN: no suspicious lesions or rashes  NEURO: Upper extremity reflexes intact intact, gross motor function intact  PSYCH:  mentation appears normal and affect normal/bright    MUSCULOSKELETAL:   Posture: Anterior head carriage, rounded shoulders.    Passive thoracic extension is restricted in the mid thoracic spine  Deep inspiration negative for chest pain or rib pain currently  Rib compression negative for rib angle pain    +Tenderness: Point tenderness T5-T6 spinous process and left transverse process.  Primary tenderness over left T6 transverse and costotransverse junction.  Tenderness over costosternal joint on the left T6.  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  Patient was weightlifting and shifted on the bench.  Symptoms have reduced over the last 6 weeks.  He does have marked muscle hypertonicity and some spasms surrounding the area as well as tenderness with palpation.  No immediate contraindication to care.  Patient does have a cardiac history and notes chest discomfort along the sternum as well that developed after the rib pain started.  Working diagnosis at this time is that it is secondary to slipped rib syndrome causing discomfort and dysfunction at the costosternal joint as well.  If chest pain does not reduce, I have advised follow-up with cardiologist.  Patient has met with cardiologist and does so regularly.  I anticipate good response to treatment.  I will not necessitate follow-up unless symptoms return or persist.  Symptoms may end up taking longer to respond as they have been present for 6 weeks and there is marked muscle tone changes around the area.  Trial of care could consist of 1-6 visits.         1. Segmental and somatic dysfunction of thoracic region    2. Slipped rib syndrome    3. Pain in thoracic spine        PLAN    Evaluation and Management:  04326 Low to Moderate exam established patient 10 min  Total time spent on the date of this encounter, including chart review, history/exam and documentation: 18 mins    Procedures:  Modalities:  None performed this visit    CMT:  14171 Chiropractic manipulative treatment 1-2 regions performed   Thoracic: Diversified, T6, Prone, excellent release of T6 and left costovertebral joint as well.    Therapeutic procedures:  39413: Manual therapy, myofascial release and trigger point to the thoracic paraspinals and rhomboids surrounding the T6 region to reduce spasm and guarding prior to manipulative treatment.  Total treatment time 5 minutes.    Response to Treatment  Patient tolerated  "treatment very well and expressed immediate reduction in symptoms.  Mild discomfort was still felt with direct pressure over the area.  Patient states \"it feels great\".    Prognosis: Good    10/28/2024 Plan of Care: Plan of care will consist of 1-6 visits over the next 6 weeks if needed.  Initial treatment will be determined on an as-needed basis if symptoms return.     10/28/2024 Goals:      Goals of treatment include reducing patient's pain intensity and frequency in the region, allowing patient to have normal ADLs such as working out without increasing discomfort, decreasing Oswestry score to baseline.      INSTRUCTIONS   Patient was instructed to utilize ice if soreness is felt.  I have advised him that it is not uncommon to experience soreness following treatment of rib syndrome.  Soreness should feel more like a workout than a pain.  Patient was also instructed how to perform a sternal and chest stretch to help alleviate discomfort along the costosternal joint.  Maintain mobility and no activity limitations or modifications needed.    Follow-up:  Patient has been recommended to follow-up for care if symptoms return or persist.    "

## 2024-10-29 NOTE — TELEPHONE ENCOUNTER
2/19/24  TSH last checked.      Due for annual wellness visit.      Do you want to order TSH or have him schedule physical and check at that time?     Ryanne Dunbar RN on 10/29/2024 at 11:46 AM

## 2024-11-20 DIAGNOSIS — E06.3 HASHIMOTO'S THYROIDITIS: ICD-10-CM

## 2024-11-20 RX ORDER — LEVOTHYROXINE SODIUM 75 UG/1
75 TABLET ORAL DAILY
Qty: 90 TABLET | Refills: 3 | Status: SHIPPED | OUTPATIENT
Start: 2024-11-20

## 2024-11-20 NOTE — TELEPHONE ENCOUNTER
[facility] sent Rx request for the following:      Requested Prescriptions   Pending Prescriptions Disp Refills    levothyroxine (SYNTHROID/LEVOTHROID) 75 MCG tablet [Pharmacy Med Name: levothyroxine 75 mcg tablet] 90 tablet 3     Sig: Take 1 tablet (75 mcg) by mouth daily       Thyroid Protocol Failed - 11/20/2024  4:28 PM        Failed - Recent (12 mo) or future (90 days) visit within the authorizing provider's specialty     The patient must have completed an in-person or virtual visit within the past 12 months or has a future visit scheduled within the next 90 days with the authorizing provider s specialty.  Urgent care and e-visits do not qualify as an office visit for this protocol.         Last Prescription Date:   12/6/23  Last Fill Qty/Refills:         90, R-3    Last Office Visit:              5/24/23   Future Office visit:           none    Routing refill request to provider for review/approval because:  Patient needs to be seen because it has been more than 1 year since last office visit.    Lashawn Olivia RN on 11/20/2024 at 4:30 PM

## 2025-02-23 DIAGNOSIS — E06.3 HASHIMOTO'S THYROIDITIS: Primary | ICD-10-CM

## 2025-03-24 ENCOUNTER — MYC MEDICAL ADVICE (OUTPATIENT)
Dept: FAMILY MEDICINE | Facility: OTHER | Age: 50
End: 2025-03-24
Payer: COMMERCIAL

## 2025-03-24 DIAGNOSIS — I49.9 IRREGULAR CARDIAC RHYTHM: Primary | ICD-10-CM

## 2025-03-25 ENCOUNTER — ALLIED HEALTH/NURSE VISIT (OUTPATIENT)
Dept: FAMILY MEDICINE | Facility: OTHER | Age: 50
End: 2025-03-25
Attending: FAMILY MEDICINE
Payer: COMMERCIAL

## 2025-03-25 DIAGNOSIS — I49.9 ABNORMAL HEART RHYTHMS: Primary | ICD-10-CM

## 2025-03-25 NOTE — PROGRESS NOTES
Pt presented to nurse injection room to get an EKG, orders in Epic. EKG ordered by Dr. Maximo Maharaj due to medication. EKG performed and transmitted, then imported to [place].    Lashawn Olivia RN ....................  3/25/2025   3:02 PM

## 2025-03-25 NOTE — TELEPHONE ENCOUNTER
Needs EKG orders for follow up on [place]. Has appt today.     Appt note says [facility] will be placing EKG orders.     Santos'd up orders.     Routing to provider to review and respond.  Yousif Springer RN on 3/25/2025 at 8:04 AM

## 2025-03-27 LAB
ATRIAL RATE - MUSE: 60 BPM
DIASTOLIC BLOOD PRESSURE - MUSE: NORMAL MMHG
INTERPRETATION ECG - MUSE: NORMAL
P AXIS - MUSE: 33 DEGREES
PR INTERVAL - MUSE: 160 MS
QRS DURATION - MUSE: 86 MS
QT - MUSE: 426 MS
QTC - MUSE: 426 MS
R AXIS - MUSE: 45 DEGREES
SYSTOLIC BLOOD PRESSURE - MUSE: NORMAL MMHG
T AXIS - MUSE: 21 DEGREES
VENTRICULAR RATE- MUSE: 60 BPM

## 2025-04-23 ENCOUNTER — ALLIED HEALTH/NURSE VISIT (OUTPATIENT)
Dept: FAMILY MEDICINE | Facility: OTHER | Age: 50
End: 2025-04-23
Attending: FAMILY MEDICINE
Payer: COMMERCIAL

## 2025-04-23 DIAGNOSIS — Z79.899 MEDICATION DOSE CHANGED: Primary | ICD-10-CM

## 2025-04-23 NOTE — PROGRESS NOTES
Pt presented to nurse injection room to get an EKG. EKG ordered by case,tommy BORRERO due to 4/23/2025. EKG performed and tracing faxed to ordering provider. EKG transmitted and imported to [facility], then tracing and written order sent for scanning into Epic.     Ru Kay RN ....................  4/23/2025   3:54 PM

## 2025-04-24 LAB
ATRIAL RATE - MUSE: 53 BPM
DIASTOLIC BLOOD PRESSURE - MUSE: NORMAL MMHG
INTERPRETATION ECG - MUSE: NORMAL
P AXIS - MUSE: 18 DEGREES
PR INTERVAL - MUSE: 162 MS
QRS DURATION - MUSE: 92 MS
QT - MUSE: 454 MS
QTC - MUSE: 426 MS
R AXIS - MUSE: 39 DEGREES
SYSTOLIC BLOOD PRESSURE - MUSE: NORMAL MMHG
T AXIS - MUSE: 30 DEGREES
VENTRICULAR RATE- MUSE: 53 BPM

## 2025-06-12 ENCOUNTER — DOCUMENTATION ONLY (OUTPATIENT)
Dept: FAMILY MEDICINE | Facility: OTHER | Age: 50
End: 2025-06-12
Payer: COMMERCIAL

## 2025-06-12 DIAGNOSIS — E06.3 HASHIMOTO'S THYROIDITIS: Primary | ICD-10-CM

## 2025-06-12 NOTE — PROGRESS NOTES
Sonny Dubois has an upcoming lab appointment and there are no orders available. Please review and place future orders, as appropriate.    Jessica Daniel

## 2025-06-12 NOTE — PROGRESS NOTES
Patient sees [facility] for cardiology.  Winifred Patricio LPN ....................6/12/2025   4:19 PM

## 2025-06-12 NOTE — PROGRESS NOTES
Routing to patient's primary Sona Smith MD to address future lab orders.    Roshni Duque LPN............6/12/2025 1:43 PM

## 2025-06-12 NOTE — PROGRESS NOTES
Call patient - I have his thyroid orders in.  Check if cardiology has additional orders for him.    Sona Smith MD

## 2025-06-18 ENCOUNTER — RESULTS FOLLOW-UP (OUTPATIENT)
Dept: FAMILY MEDICINE | Facility: OTHER | Age: 50
End: 2025-06-18

## 2025-06-18 ENCOUNTER — LAB (OUTPATIENT)
Dept: LAB | Facility: OTHER | Age: 50
End: 2025-06-18
Payer: COMMERCIAL

## 2025-06-18 DIAGNOSIS — E06.3 HASHIMOTO'S THYROIDITIS: ICD-10-CM

## 2025-06-18 LAB
ALT SERPL W P-5'-P-CCNC: 30 U/L (ref 0–70)
ANION GAP SERPL CALCULATED.3IONS-SCNC: 8 MMOL/L (ref 7–15)
AST SERPL W P-5'-P-CCNC: 26 U/L (ref 0–45)
BUN SERPL-MCNC: 18.9 MG/DL (ref 6–20)
CALCIUM SERPL-MCNC: 9.1 MG/DL (ref 8.8–10.4)
CHLORIDE SERPL-SCNC: 103 MMOL/L (ref 98–107)
CHOLEST SERPL-MCNC: 196 MG/DL
CREAT SERPL-MCNC: 1.46 MG/DL (ref 0.67–1.17)
EGFRCR SERPLBLD CKD-EPI 2021: 58 ML/MIN/1.73M2
ERYTHROCYTE [DISTWIDTH] IN BLOOD BY AUTOMATED COUNT: 12.6 % (ref 10–15)
FASTING STATUS PATIENT QL REPORTED: NO
FASTING STATUS PATIENT QL REPORTED: NO
GLUCOSE SERPL-MCNC: 97 MG/DL (ref 70–99)
HCO3 SERPL-SCNC: 26 MMOL/L (ref 22–29)
HCT VFR BLD AUTO: 40 % (ref 40–53)
HDLC SERPL-MCNC: 58 MG/DL
HGB BLD-MCNC: 13.4 G/DL (ref 13.3–17.7)
LDLC SERPL CALC-MCNC: 118 MG/DL
MCH RBC QN AUTO: 32 PG (ref 26.5–33)
MCHC RBC AUTO-ENTMCNC: 33.5 G/DL (ref 31.5–36.5)
MCV RBC AUTO: 96 FL (ref 78–100)
NONHDLC SERPL-MCNC: 138 MG/DL
PLATELET # BLD AUTO: 201 10E3/UL (ref 150–450)
POTASSIUM SERPL-SCNC: 4.4 MMOL/L (ref 3.4–5.3)
RBC # BLD AUTO: 4.19 10E6/UL (ref 4.4–5.9)
SODIUM SERPL-SCNC: 137 MMOL/L (ref 135–145)
T4 FREE SERPL-MCNC: 1.27 NG/DL (ref 0.9–1.7)
TRIGL SERPL-MCNC: 101 MG/DL
TSH SERPL DL<=0.005 MIU/L-ACNC: 3.5 UIU/ML (ref 0.3–4.2)
WBC # BLD AUTO: 4.1 10E3/UL (ref 4–11)

## 2025-06-18 PROCEDURE — 85018 HEMOGLOBIN: CPT | Mod: ZL

## 2025-06-18 PROCEDURE — 84443 ASSAY THYROID STIM HORMONE: CPT | Mod: ZL

## 2025-06-18 PROCEDURE — 36415 COLL VENOUS BLD VENIPUNCTURE: CPT | Mod: ZL

## 2025-06-18 PROCEDURE — 80048 BASIC METABOLIC PNL TOTAL CA: CPT | Mod: ZL

## 2025-06-18 PROCEDURE — 84450 TRANSFERASE (AST) (SGOT): CPT | Mod: ZL

## 2025-06-18 PROCEDURE — 84460 ALANINE AMINO (ALT) (SGPT): CPT | Mod: ZL

## 2025-06-18 PROCEDURE — 82465 ASSAY BLD/SERUM CHOLESTEROL: CPT | Mod: ZL

## 2025-06-18 PROCEDURE — 84439 ASSAY OF FREE THYROXINE: CPT | Mod: ZL

## 2025-06-30 ENCOUNTER — MYC REFILL (OUTPATIENT)
Dept: FAMILY MEDICINE | Facility: OTHER | Age: 50
End: 2025-06-30
Payer: COMMERCIAL

## 2025-06-30 ENCOUNTER — MYC MEDICAL ADVICE (OUTPATIENT)
Dept: FAMILY MEDICINE | Facility: OTHER | Age: 50
End: 2025-06-30
Payer: COMMERCIAL

## 2025-06-30 DIAGNOSIS — R07.9 CARDIAC CHEST PAIN: Primary | ICD-10-CM

## 2025-06-30 DIAGNOSIS — I47.20 PAROXYSMAL VENTRICULAR TACHYCARDIA (H): Primary | ICD-10-CM

## 2025-06-30 RX ORDER — ISOSORBIDE MONONITRATE 30 MG/1
30 TABLET, EXTENDED RELEASE ORAL DAILY
Qty: 90 TABLET | Refills: 3 | Status: SHIPPED | OUTPATIENT
Start: 2025-06-30

## 2025-06-30 RX ORDER — AMLODIPINE BESYLATE 2.5 MG/1
2.5 TABLET ORAL DAILY
Qty: 90 TABLET | Refills: 3 | Status: SHIPPED | OUTPATIENT
Start: 2025-06-30

## 2025-06-30 NOTE — TELEPHONE ENCOUNTER
Had Angiogram last week and they requested Kidney function be rechecked this week. Needs lab orders.     Also started on Amlodipine and Imdur. Was given 30 day supply with PCP to refill. Needs new Rx.     Per Note from Cardiology OV 6/25:    Santos'd up order for BMP, Amlodipine, and Imdur.     Routing to provider to review and respond.  Yousif Springer RN on 6/30/2025 at 12:32 PM

## 2025-07-01 ENCOUNTER — RESULTS FOLLOW-UP (OUTPATIENT)
Dept: FAMILY MEDICINE | Facility: OTHER | Age: 50
End: 2025-07-01

## 2025-07-01 ENCOUNTER — LAB (OUTPATIENT)
Dept: LAB | Facility: OTHER | Age: 50
End: 2025-07-01
Attending: FAMILY MEDICINE
Payer: COMMERCIAL

## 2025-07-01 DIAGNOSIS — I47.20 PAROXYSMAL VENTRICULAR TACHYCARDIA (H): ICD-10-CM

## 2025-07-01 DIAGNOSIS — I47.20 PAROXYSMAL VENTRICULAR TACHYCARDIA (H): Primary | ICD-10-CM

## 2025-07-01 LAB
ANION GAP SERPL CALCULATED.3IONS-SCNC: 9 MMOL/L (ref 7–15)
BUN SERPL-MCNC: 22.7 MG/DL (ref 6–20)
CALCIUM SERPL-MCNC: 8.9 MG/DL (ref 8.8–10.4)
CHLORIDE SERPL-SCNC: 103 MMOL/L (ref 98–107)
CREAT SERPL-MCNC: 1.41 MG/DL (ref 0.67–1.17)
EGFRCR SERPLBLD CKD-EPI 2021: 60 ML/MIN/1.73M2
GLUCOSE SERPL-MCNC: 93 MG/DL (ref 70–99)
HCO3 SERPL-SCNC: 26 MMOL/L (ref 22–29)
POTASSIUM SERPL-SCNC: 4.4 MMOL/L (ref 3.4–5.3)
SODIUM SERPL-SCNC: 138 MMOL/L (ref 135–145)

## 2025-07-01 PROCEDURE — 82374 ASSAY BLOOD CARBON DIOXIDE: CPT | Mod: ZL

## 2025-07-01 PROCEDURE — 80048 BASIC METABOLIC PNL TOTAL CA: CPT | Mod: ZL

## 2025-07-01 PROCEDURE — 36415 COLL VENOUS BLD VENIPUNCTURE: CPT | Mod: ZL

## 2025-07-03 RX ORDER — NITROGLYCERIN 0.4 MG/1
TABLET SUBLINGUAL
Qty: 25 TABLET | Refills: 11 | Status: SHIPPED | OUTPATIENT
Start: 2025-07-03

## 2025-07-03 NOTE — TELEPHONE ENCOUNTER
Pt sent Rx request for the following:      Requested Prescriptions   Pending Prescriptions Disp Refills    nitroGLYcerin (NITROSTAT) 0.4 MG sublingual tablet       Sig: For chest pain place 1 tablet under the tongue every 5 minutes for 3 doses. If symptoms persist 5 minutes after 1st dose call 911.       Nitrates Failed - 7/3/2025  3:01 PM        Failed - Sublingual nitro order needs review     If refill exceeds 1 bottle per month, please forward request to provider.         Failed - Medication is active on med list and the sig matches. RN to manually verify dose and sig if red X/fail.     If the protocol passes (green check), you do not need to verify med dose and sig.    A prescription matches if they are the same clinical intention.    For Example: once daily and every morning are the same.    The protocol can not identify upper and lower case letters as matching and will fail.     For Example: Take 1 tablet (50 mg) by mouth daily     TAKE 1 TABLET (50 MG) BY MOUTH DAILY    For all fails (red x), verify dose and sig.    If the refill does match what is on file, the RN can still proceed to approve the refill request.    If they do not match, route to the appropriate provider.          Failed - Medication indicated for associated diagnosis     Medication is associated with one or more of the following diagnoses:    Anal fissure   Angina pectoris   Cardiac syndrome X   Congestive heart failure   Hypertension   Myocardial infarction   Pulmonary edema   Pulmonary hypertension       Last Prescription Date:   Historical   Last Fill Qty/Refills:         0, R-0  Last Office Visit:              02/21/25   Future Office visit:           None    Jesusita Das, RN on 7/3/2025 at 3:02 PM

## 2025-08-13 ENCOUNTER — LAB (OUTPATIENT)
Dept: LAB | Facility: OTHER | Age: 50
End: 2025-08-13
Attending: FAMILY MEDICINE
Payer: COMMERCIAL

## 2025-08-13 DIAGNOSIS — I47.20 PAROXYSMAL VENTRICULAR TACHYCARDIA (H): ICD-10-CM

## 2025-08-13 LAB
ANION GAP SERPL CALCULATED.3IONS-SCNC: 8 MMOL/L (ref 7–15)
BUN SERPL-MCNC: 19.6 MG/DL (ref 6–20)
CALCIUM SERPL-MCNC: 9 MG/DL (ref 8.8–10.4)
CHLORIDE SERPL-SCNC: 103 MMOL/L (ref 98–107)
CREAT SERPL-MCNC: 1.21 MG/DL (ref 0.67–1.17)
EGFRCR SERPLBLD CKD-EPI 2021: 72 ML/MIN/1.73M2
GLUCOSE SERPL-MCNC: 94 MG/DL (ref 70–99)
HCO3 SERPL-SCNC: 27 MMOL/L (ref 22–29)
POTASSIUM SERPL-SCNC: 4.2 MMOL/L (ref 3.4–5.3)
SODIUM SERPL-SCNC: 138 MMOL/L (ref 135–145)

## 2025-08-13 PROCEDURE — 36415 COLL VENOUS BLD VENIPUNCTURE: CPT | Mod: ZL

## 2025-08-13 PROCEDURE — 80048 BASIC METABOLIC PNL TOTAL CA: CPT | Mod: ZL

## (undated) DEVICE — SOL WATER 1500ML

## (undated) DEVICE — TUBING SUCTION 10'X3/16" N510

## (undated) DEVICE — ENDO BRUSH CHANNEL MASTER CLEANING 2-4.2MM BW-412T

## (undated) DEVICE — SUCTION MANIFOLD NEPTUNE 2 SYS 4 PORT 0702-020-000

## (undated) DEVICE — ENDO KIT COMPLIANCE DYKENDOCMPLY

## (undated) RX ORDER — PROPOFOL 10 MG/ML
INJECTION, EMULSION INTRAVENOUS
Status: DISPENSED
Start: 2022-11-14

## (undated) RX ORDER — LIDOCAINE HYDROCHLORIDE 20 MG/ML
INJECTION, SOLUTION EPIDURAL; INFILTRATION; INTRACAUDAL; PERINEURAL
Status: DISPENSED
Start: 2022-11-14

## (undated) RX ORDER — NITROGLYCERIN 0.4 MG/1
TABLET SUBLINGUAL
Status: DISPENSED
Start: 2023-06-20